# Patient Record
Sex: FEMALE | Race: WHITE | Employment: OTHER | URBAN - METROPOLITAN AREA
[De-identification: names, ages, dates, MRNs, and addresses within clinical notes are randomized per-mention and may not be internally consistent; named-entity substitution may affect disease eponyms.]

---

## 2017-02-07 PROBLEM — M25.561 RIGHT KNEE PAIN, UNSPECIFIED CHRONICITY: Status: ACTIVE | Noted: 2017-02-07

## 2017-03-03 PROCEDURE — 82570 ASSAY OF URINE CREATININE: CPT | Performed by: INTERNAL MEDICINE

## 2017-03-03 PROCEDURE — 82043 UR ALBUMIN QUANTITATIVE: CPT | Performed by: INTERNAL MEDICINE

## 2017-03-07 PROBLEM — M76.30 ITB SYNDROME: Status: ACTIVE | Noted: 2017-03-07

## 2017-03-10 PROBLEM — M76.31 ILIOTIBIAL BAND SYNDROME, RIGHT: Status: ACTIVE | Noted: 2017-03-10

## 2017-04-17 PROBLEM — T84.84XA PAINFUL TOTAL KNEE REPLACEMENT, INITIAL ENCOUNTER (HCC): Status: ACTIVE | Noted: 2017-04-17

## 2017-04-17 PROBLEM — Z96.659 PAINFUL TOTAL KNEE REPLACEMENT, INITIAL ENCOUNTER: Status: ACTIVE | Noted: 2017-04-17

## 2017-04-17 PROBLEM — T84.84XA PAINFUL TOTAL KNEE REPLACEMENT, INITIAL ENCOUNTER: Status: ACTIVE | Noted: 2017-04-17

## 2017-04-17 PROBLEM — Z96.659 PAINFUL TOTAL KNEE REPLACEMENT, INITIAL ENCOUNTER (HCC): Status: ACTIVE | Noted: 2017-04-17

## 2017-08-28 ENCOUNTER — LAB ENCOUNTER (OUTPATIENT)
Dept: LAB | Facility: HOSPITAL | Age: 62
End: 2017-08-28
Attending: INTERNAL MEDICINE
Payer: COMMERCIAL

## 2017-08-28 ENCOUNTER — HOSPITAL ENCOUNTER (OUTPATIENT)
Dept: GENERAL RADIOLOGY | Facility: HOSPITAL | Age: 62
Discharge: HOME | End: 2017-08-28
Attending: INTERNAL MEDICINE
Payer: COMMERCIAL

## 2017-08-28 DIAGNOSIS — J44.1 COPD WITH ACUTE EXACERBATION (HCC): ICD-10-CM

## 2017-08-28 LAB
BASOPHILS # BLD AUTO: 0.03 X10(3) UL (ref 0–0.1)
BASOPHILS NFR BLD AUTO: 0.3 %
EOSINOPHIL # BLD AUTO: 0.28 X10(3) UL (ref 0–0.3)
EOSINOPHIL NFR BLD AUTO: 3.2 %
ERYTHROCYTE [DISTWIDTH] IN BLOOD BY AUTOMATED COUNT: 14.4 % (ref 11.5–16)
HCT VFR BLD AUTO: 35.1 % (ref 34–50)
HGB BLD-MCNC: 11 G/DL (ref 12–16)
IMMATURE GRANULOCYTE COUNT: 0.03 X10(3) UL (ref 0–1)
IMMATURE GRANULOCYTE RATIO %: 0.3 %
LYMPHOCYTES # BLD AUTO: 2.42 X10(3) UL (ref 0.9–4)
LYMPHOCYTES NFR BLD AUTO: 27.4 %
MCH RBC QN AUTO: 26.4 PG (ref 27–33.2)
MCHC RBC AUTO-ENTMCNC: 31.3 G/DL (ref 31–37)
MCV RBC AUTO: 84.4 FL (ref 81–100)
MONOCYTES # BLD AUTO: 0.9 X10(3) UL (ref 0.1–0.6)
MONOCYTES NFR BLD AUTO: 10.2 %
NEUTROPHIL ABS PRELIM: 5.18 X10 (3) UL (ref 1.3–6.7)
NEUTROPHILS # BLD AUTO: 5.18 X10(3) UL (ref 1.3–6.7)
NEUTROPHILS NFR BLD AUTO: 58.6 %
PLATELET # BLD AUTO: 279 10(3)UL (ref 150–450)
RBC # BLD AUTO: 4.16 X10(6)UL (ref 3.8–5.1)
RED CELL DISTRIBUTION WIDTH-SD: 44.2 FL (ref 35.1–46.3)
WBC # BLD AUTO: 8.8 X10(3) UL (ref 4–13)

## 2017-08-28 PROCEDURE — 36415 COLL VENOUS BLD VENIPUNCTURE: CPT

## 2017-08-28 PROCEDURE — 71020 XR CHEST PA + LAT CHEST (CPT=71020): CPT | Performed by: INTERNAL MEDICINE

## 2017-08-28 PROCEDURE — 85025 COMPLETE CBC W/AUTO DIFF WBC: CPT

## 2017-09-05 ENCOUNTER — LAB ENCOUNTER (OUTPATIENT)
Dept: LAB | Facility: HOSPITAL | Age: 62
End: 2017-09-05
Attending: INTERNAL MEDICINE
Payer: COMMERCIAL

## 2017-09-05 DIAGNOSIS — J45.21 MILD INTERMITTENT ASTHMA WITH ACUTE EXACERBATION: ICD-10-CM

## 2017-09-05 DIAGNOSIS — D64.9 ANEMIA, UNSPECIFIED TYPE: ICD-10-CM

## 2017-09-05 LAB
DEPRECATED HBV CORE AB SER IA-ACNC: 13.9 NG/ML (ref 10–291)
HAV AB SERPL IA-ACNC: 614 PG/ML (ref 193–986)
IRON SATURATION: 13 % (ref 13–45)
IRON: 79 UG/DL (ref 28–170)
TOTAL IRON BINDING CAPACITY: 590 UG/DL (ref 298–536)
TRANSFERRIN: 396 MG/DL (ref 200–360)

## 2017-09-05 PROCEDURE — 83540 ASSAY OF IRON: CPT

## 2017-09-05 PROCEDURE — 83550 IRON BINDING TEST: CPT

## 2017-09-05 PROCEDURE — 82607 VITAMIN B-12: CPT

## 2017-09-05 PROCEDURE — 36415 COLL VENOUS BLD VENIPUNCTURE: CPT

## 2017-09-05 PROCEDURE — 82728 ASSAY OF FERRITIN: CPT

## 2017-09-06 PROCEDURE — 86141 C-REACTIVE PROTEIN HS: CPT | Performed by: ORTHOPAEDIC SURGERY

## 2017-09-06 PROCEDURE — 82784 ASSAY IGA/IGD/IGG/IGM EACH: CPT | Performed by: INTERNAL MEDICINE

## 2017-09-06 PROCEDURE — 83516 IMMUNOASSAY NONANTIBODY: CPT | Performed by: INTERNAL MEDICINE

## 2017-09-12 ENCOUNTER — LAB ENCOUNTER (OUTPATIENT)
Dept: LAB | Age: 62
End: 2017-09-12
Attending: ORTHOPAEDIC SURGERY
Payer: COMMERCIAL

## 2017-09-12 DIAGNOSIS — Z96.651 STATUS POST TOTAL RIGHT KNEE REPLACEMENT: ICD-10-CM

## 2017-09-12 DIAGNOSIS — M25.561 RIGHT KNEE PAIN, UNSPECIFIED CHRONICITY: ICD-10-CM

## 2017-09-12 PROCEDURE — 87070 CULTURE OTHR SPECIMN AEROBIC: CPT

## 2017-09-12 PROCEDURE — 87205 SMEAR GRAM STAIN: CPT

## 2017-09-12 PROCEDURE — 89051 BODY FLUID CELL COUNT: CPT

## 2017-09-12 PROCEDURE — 89060 EXAM SYNOVIAL FLUID CRYSTALS: CPT

## 2017-09-13 LAB
BASOPHIL OTHER BODY FLUID: 0 %
CRYSTALS: NEGATIVE
EOSINOPHIL OTHER BODY FLUID: 0 %
LYMPHOCYTE OTH BODY FLUID: 31 %
MONO/MAC/HISTIO OTH BF FLUID: 51 %
NEUTROPHIL OTH BODY FLUID: 17 %
RBC OTH BODY FL: NORMAL /MM3
TOTAL CELLS COUNTED: 35
WBC OTH BF: 845 /MM3

## 2017-09-26 PROBLEM — M47.896 OTHER OSTEOARTHRITIS OF SPINE, LUMBAR REGION: Status: ACTIVE | Noted: 2017-09-26

## 2017-10-11 PROCEDURE — 86003 ALLG SPEC IGE CRUDE XTRC EA: CPT | Performed by: INTERNAL MEDICINE

## 2017-12-26 PROBLEM — J43.9 PULMONARY EMPHYSEMA, UNSPECIFIED EMPHYSEMA TYPE (HCC): Status: ACTIVE | Noted: 2017-12-26

## 2017-12-26 PROBLEM — J45.50 SEVERE PERSISTENT ASTHMA WITHOUT COMPLICATION: Status: ACTIVE | Noted: 2017-12-26

## 2017-12-26 PROBLEM — J45.50 SEVERE PERSISTENT ASTHMA WITHOUT COMPLICATION (HCC): Status: ACTIVE | Noted: 2017-12-26

## 2018-03-21 PROCEDURE — 87086 URINE CULTURE/COLONY COUNT: CPT | Performed by: INTERNAL MEDICINE

## 2018-04-25 PROCEDURE — 82043 UR ALBUMIN QUANTITATIVE: CPT | Performed by: INTERNAL MEDICINE

## 2018-04-25 PROCEDURE — 82570 ASSAY OF URINE CREATININE: CPT | Performed by: INTERNAL MEDICINE

## 2018-06-04 PROBLEM — M66.831: Status: ACTIVE | Noted: 2018-06-04

## 2018-07-09 PROBLEM — M47.816 LUMBAR FACET ARTHROPATHY: Status: ACTIVE | Noted: 2018-07-09

## 2018-07-09 PROBLEM — M51.36 DDD (DEGENERATIVE DISC DISEASE), LUMBAR: Status: ACTIVE | Noted: 2018-07-09

## 2018-07-09 PROBLEM — M51.36 ANNULAR TEAR OF LUMBAR DISC: Status: ACTIVE | Noted: 2018-07-09

## 2018-08-14 PROBLEM — M76.51 PATELLAR TENDINITIS OF RIGHT KNEE: Status: ACTIVE | Noted: 2018-08-14

## 2018-08-31 PROBLEM — N39.41 URGE INCONTINENCE: Status: ACTIVE | Noted: 2018-08-31

## 2018-12-18 PROBLEM — J82.83 EOSINOPHILIC ASTHMA (HCC): Status: ACTIVE | Noted: 2018-12-18

## 2018-12-18 PROBLEM — J82.83 EOSINOPHILIC ASTHMA: Status: ACTIVE | Noted: 2018-12-18

## 2019-08-20 PROBLEM — S46.011A STRAIN OF RIGHT ROTATOR CUFF CAPSULE, INITIAL ENCOUNTER: Status: ACTIVE | Noted: 2019-08-20

## 2019-08-27 PROBLEM — M75.121 COMPLETE TEAR OF RIGHT ROTATOR CUFF, UNSPECIFIED WHETHER TRAUMATIC: Status: ACTIVE | Noted: 2019-08-27

## 2019-09-04 ENCOUNTER — ANESTHESIA EVENT (OUTPATIENT)
Dept: SURGERY | Facility: HOSPITAL | Age: 64
End: 2019-09-04
Payer: COMMERCIAL

## 2019-09-05 ENCOUNTER — HOSPITAL ENCOUNTER (OUTPATIENT)
Facility: HOSPITAL | Age: 64
Setting detail: HOSPITAL OUTPATIENT SURGERY
Discharge: HOME | End: 2019-09-05
Attending: ORTHOPAEDIC SURGERY | Admitting: ORTHOPAEDIC SURGERY
Payer: COMMERCIAL

## 2019-09-05 ENCOUNTER — ANESTHESIA (OUTPATIENT)
Dept: SURGERY | Facility: HOSPITAL | Age: 64
End: 2019-09-05
Payer: COMMERCIAL

## 2019-09-05 VITALS
BODY MASS INDEX: 37.09 KG/M2 | SYSTOLIC BLOOD PRESSURE: 116 MMHG | OXYGEN SATURATION: 93 % | DIASTOLIC BLOOD PRESSURE: 60 MMHG | TEMPERATURE: 98 F | HEART RATE: 82 BPM | RESPIRATION RATE: 14 BRPM | HEIGHT: 64 IN | WEIGHT: 217.25 LBS

## 2019-09-05 DIAGNOSIS — M75.121 COMPLETE TEAR OF RIGHT ROTATOR CUFF, UNSPECIFIED WHETHER TRAUMATIC: ICD-10-CM

## 2019-09-05 LAB
GLUCOSE BLD-MCNC: 106 MG/DL (ref 70–99)
GLUCOSE BLD-MCNC: 92 MG/DL (ref 70–99)

## 2019-09-05 PROCEDURE — 3E0T3BZ INTRODUCTION OF ANESTHETIC AGENT INTO PERIPHERAL NERVES AND PLEXI, PERCUTANEOUS APPROACH: ICD-10-PCS | Performed by: ANESTHESIOLOGY

## 2019-09-05 PROCEDURE — 0LM10ZZ REATTACHMENT OF RIGHT SHOULDER TENDON, OPEN APPROACH: ICD-10-PCS | Performed by: ORTHOPAEDIC SURGERY

## 2019-09-05 PROCEDURE — 76942 ECHO GUIDE FOR BIOPSY: CPT | Performed by: ORTHOPAEDIC SURGERY

## 2019-09-05 PROCEDURE — 82962 GLUCOSE BLOOD TEST: CPT

## 2019-09-05 DEVICE — IMPLANTABLE DEVICE: Type: IMPLANTABLE DEVICE | Site: SHOULDER | Status: FUNCTIONAL

## 2019-09-05 DEVICE — MAGNUMWIRE LOOSE NONABSORBABLE                                    SUTURE BLUE-COBRAID WITH 26.5 MM 1/2                                    CIRCLE TAPERED NEEDLE
Type: IMPLANTABLE DEVICE | Site: SHOULDER | Status: FUNCTIONAL
Brand: MAGNUMWIRE

## 2019-09-05 RX ORDER — ONDANSETRON 2 MG/ML
4 INJECTION INTRAMUSCULAR; INTRAVENOUS AS NEEDED
Status: DISCONTINUED | OUTPATIENT
Start: 2019-09-05 | End: 2019-09-05

## 2019-09-05 RX ORDER — MEPERIDINE HYDROCHLORIDE 25 MG/ML
12.5 INJECTION INTRAMUSCULAR; INTRAVENOUS; SUBCUTANEOUS AS NEEDED
Status: DISCONTINUED | OUTPATIENT
Start: 2019-09-05 | End: 2019-09-05

## 2019-09-05 RX ORDER — HYDROCODONE BITARTRATE AND ACETAMINOPHEN 5; 325 MG/1; MG/1
1 TABLET ORAL AS NEEDED
Status: COMPLETED | OUTPATIENT
Start: 2019-09-05 | End: 2019-09-05

## 2019-09-05 RX ORDER — NALOXONE HYDROCHLORIDE 0.4 MG/ML
80 INJECTION, SOLUTION INTRAMUSCULAR; INTRAVENOUS; SUBCUTANEOUS AS NEEDED
Status: DISCONTINUED | OUTPATIENT
Start: 2019-09-05 | End: 2019-09-05

## 2019-09-05 RX ORDER — ACETAMINOPHEN 500 MG
500 TABLET ORAL EVERY 6 HOURS PRN
COMMUNITY

## 2019-09-05 RX ORDER — DEXTROSE MONOHYDRATE 25 G/50ML
50 INJECTION, SOLUTION INTRAVENOUS
Status: DISCONTINUED | OUTPATIENT
Start: 2019-09-05 | End: 2019-09-05

## 2019-09-05 RX ORDER — HYDROCODONE BITARTRATE AND ACETAMINOPHEN 5; 325 MG/1; MG/1
2 TABLET ORAL AS NEEDED
Status: COMPLETED | OUTPATIENT
Start: 2019-09-05 | End: 2019-09-05

## 2019-09-05 RX ORDER — SCOLOPAMINE TRANSDERMAL SYSTEM 1 MG/1
1 PATCH, EXTENDED RELEASE TRANSDERMAL
Status: DISCONTINUED | OUTPATIENT
Start: 2019-09-05 | End: 2019-09-05

## 2019-09-05 RX ORDER — SODIUM CHLORIDE, SODIUM LACTATE, POTASSIUM CHLORIDE, CALCIUM CHLORIDE 600; 310; 30; 20 MG/100ML; MG/100ML; MG/100ML; MG/100ML
INJECTION, SOLUTION INTRAVENOUS CONTINUOUS
Status: DISCONTINUED | OUTPATIENT
Start: 2019-09-05 | End: 2019-09-05

## 2019-09-05 RX ORDER — DIPHENHYDRAMINE HYDROCHLORIDE 50 MG/ML
12.5 INJECTION INTRAMUSCULAR; INTRAVENOUS AS NEEDED
Status: DISCONTINUED | OUTPATIENT
Start: 2019-09-05 | End: 2019-09-05

## 2019-09-05 RX ORDER — CLINDAMYCIN PHOSPHATE 900 MG/50ML
900 INJECTION INTRAVENOUS ONCE
Status: COMPLETED | OUTPATIENT
Start: 2019-09-05 | End: 2019-09-05

## 2019-09-05 RX ORDER — MIDAZOLAM HYDROCHLORIDE 1 MG/ML
1 INJECTION INTRAMUSCULAR; INTRAVENOUS EVERY 5 MIN PRN
Status: DISCONTINUED | OUTPATIENT
Start: 2019-09-05 | End: 2019-09-05

## 2019-09-05 RX ORDER — HYDROMORPHONE HYDROCHLORIDE 1 MG/ML
0.4 INJECTION, SOLUTION INTRAMUSCULAR; INTRAVENOUS; SUBCUTANEOUS EVERY 5 MIN PRN
Status: DISCONTINUED | OUTPATIENT
Start: 2019-09-05 | End: 2019-09-05

## 2019-09-05 RX ORDER — METOCLOPRAMIDE HYDROCHLORIDE 5 MG/ML
10 INJECTION INTRAMUSCULAR; INTRAVENOUS AS NEEDED
Status: DISCONTINUED | OUTPATIENT
Start: 2019-09-05 | End: 2019-09-05

## 2019-09-05 RX ORDER — DEXTROSE MONOHYDRATE 25 G/50ML
50 INJECTION, SOLUTION INTRAVENOUS
Status: DISCONTINUED | OUTPATIENT
Start: 2019-09-05 | End: 2019-09-05 | Stop reason: HOSPADM

## 2019-09-05 RX ORDER — SCOLOPAMINE TRANSDERMAL SYSTEM 1 MG/1
PATCH, EXTENDED RELEASE TRANSDERMAL
Status: DISCONTINUED
Start: 2019-09-05 | End: 2019-09-05

## 2019-09-05 RX ORDER — ACETAMINOPHEN 500 MG
1000 TABLET ORAL ONCE
Status: DISCONTINUED | OUTPATIENT
Start: 2019-09-05 | End: 2019-09-05

## 2019-09-05 NOTE — ANESTHESIA POSTPROCEDURE EVALUATION
Kathryn Orozco A [place] Patient Status:  Hospital Outpatient Surgery   Age/Gender 61year old female MRN [id number]   [facility] SURGERY Attending Hyacinth Farah MD   Hosp Day # 0 PCP Pura Acosta MD       Anesthesia Post

## 2019-09-05 NOTE — ANESTHESIA PREPROCEDURE EVALUATION
PRE-OP EVALUATION    Patient Name: Montrell Hay    Pre-op Diagnosis: Complete tear of right rotator cuff, unspecified whether traumatic [M75.121]    Procedure(s):  ROTATOR CUFF REPAIR RIGHT SHOULDER    Surgeon(s) and Role:     * Agustina Spicer MD 1/2 TABLET (4 MG TOTAL) BY MOUTH DAILY AS NEEDED FOR MUSCLE SPASMS. Disp: 60 tablet Rfl: 1   Fluticasone Propionate (FLONASE ALLERGY RELIEF) 50 MCG/ACT Nasal Suspension by Nasal route daily.    Disp:  Rfl:    Ranitidine HCl 150 MG Oral Tab Take 150 mg by mo Remy Martinez MD at . [facility] 139 Right 8/21/2018    Performed by Remy Martinez MD at [facility]   • OTHER SURGICAL HISTORY  2010    Sinus    • OTHER SURGICAL HISTORY  11/17/14    [facility], infection, facial droop, phrenic block   Plan/risks discussed with: patient and spouse                Present on Admission:  **None**

## 2019-09-05 NOTE — H&P
Pearl LOPEZ [place]   9/3/2019 9:00 AM   Office Visit   MRN:  [id number]   Description: 61year old female Provider: Yesenia Parisi MD Department: Sp Pulmonary Medicine   Scanning Cover Sheet     Click to print Dayanara Circe 850 for scan   Performed by Patric Benton MD at [facility] MAIN OR   • KNEE REPLACEMENT SURGERY Right     • KNEE TOTAL REPLACEMENT Right 9/8/2016     Performed by Patric Benton MD at [facility] MAIN OR   • LEFT PHACOEMULSIFICATION OF CATARACT WITH [place] IMPLANT 7133 GABAPENTIN 300 MG Oral Cap TAKE 2 CAPSULES BY MOUTH EVERY MORNING, THEN 3 CAPSULES EVERY NIGHT Disp: 450 capsule Rfl: 1   SPIRIVA RESPIMAT 2.5 MCG/ACT Inhalation Aero Soln INHALE 2 PUFFS INTO THE LUNGS DAILY.  Disp: 3 Inhaler Rfl: 1   triamcinolone acetonid Alcohol/week: 5.8 standard drinks      Types: 7 Glasses of wine per week      Comment: 1-2 drinks daily    Drug use:  No           Family History   Problem Relation Age of Onset   • Breast Cancer Paternal Grandmother 72         dx 63's   • Cancer Patern -needs scan in January 2020- ordered today  4. Obesity  -discussed need to lose weight as it will help her asthma control  -discussed screening for RACHEL at next visit  5.  Preop Resp Exam  -pt is at mild increased risk for planned shoulder surgery with Dr. Bonifacio Chen

## 2019-09-05 NOTE — INTERVAL H&P NOTE
Pre-op Diagnosis: Complete tear of right rotator cuff, unspecified whether traumatic [M75.121]    The above referenced H&P was reviewed by Jake Miguel MD on 9/5/2019, the patient was examined and no significant changes have occurred in the patient's c

## 2019-09-05 NOTE — BRIEF OP NOTE
Pre-Operative Diagnosis: Complete tear of right rotator cuff, unspecified whether traumatic [M75.121]     Post-Operative Diagnosis: Complete tear of right rotator cuff, unspecified whether traumatic [M75.121]      Procedure Performed:   Procedure(s):  ROTA

## 2019-09-06 NOTE — OPERATIVE REPORT
[facility]    PATIENT'S NAME: Sarah Ibrahim   ATTENDING PHYSICIAN: Adan Espinal M.D. OPERATING PHYSICIAN: Adan Espinal M.D.    PATIENT ACCOUNT#:   [de-identified]    LOCATION:  [address] [place] [address] EDW 10  MEDICAL RECORD #:   [id number] then examined and found to be retracted and, with release, it was better mobilized but still not perfect condition at the tendon itself. The leading edge of the tendon was debrided using the knife.   The bur was used to partially decorticate the insertion procedure well. There were no complications. Blood loss was minimal.  Patient was taken to Recovery in stable condition, to be discharged home when fully recovered.     Dictated By Roula Lynn M.D.  d: 09/06/2019 07:36:02  t: 09/06/2019 10:27:45  Job 2

## 2019-09-09 PROBLEM — Z47.89 ORTHOPEDIC AFTERCARE: Status: ACTIVE | Noted: 2019-09-09

## 2019-10-01 PROBLEM — Z98.890 STATUS POST RIGHT ROTATOR CUFF REPAIR: Status: ACTIVE | Noted: 2019-10-01

## 2020-03-06 ENCOUNTER — APPOINTMENT (OUTPATIENT)
Dept: GENERAL RADIOLOGY | Facility: HOSPITAL | Age: 65
End: 2020-03-06
Attending: EMERGENCY MEDICINE
Payer: COMMERCIAL

## 2020-03-06 ENCOUNTER — HOSPITAL ENCOUNTER (EMERGENCY)
Facility: HOSPITAL | Age: 65
Discharge: HOME | End: 2020-03-06
Attending: EMERGENCY MEDICINE
Payer: COMMERCIAL

## 2020-03-06 VITALS
HEART RATE: 79 BPM | BODY MASS INDEX: 37 KG/M2 | DIASTOLIC BLOOD PRESSURE: 83 MMHG | TEMPERATURE: 98 F | WEIGHT: 218 LBS | SYSTOLIC BLOOD PRESSURE: 147 MMHG | OXYGEN SATURATION: 95 % | RESPIRATION RATE: 15 BRPM

## 2020-03-06 DIAGNOSIS — J18.9 COMMUNITY ACQUIRED PNEUMONIA OF RIGHT LOWER LOBE OF LUNG: Primary | ICD-10-CM

## 2020-03-06 PROBLEM — J44.89 EMPHYSEMA WITH CHRONIC BRONCHITIS: Status: ACTIVE | Noted: 2017-10-27

## 2020-03-06 PROBLEM — M75.121 COMPLETE TEAR OF RIGHT ROTATOR CUFF, UNSPECIFIED WHETHER TRAUMATIC: Status: RESOLVED | Noted: 2019-08-27 | Resolved: 2020-03-06

## 2020-03-06 PROBLEM — J44.89 EMPHYSEMA WITH CHRONIC BRONCHITIS (HCC): Status: ACTIVE | Noted: 2017-10-27

## 2020-03-06 PROBLEM — M76.51 PATELLAR TENDINITIS OF RIGHT KNEE: Status: RESOLVED | Noted: 2018-08-14 | Resolved: 2020-03-06

## 2020-03-06 PROBLEM — J43.9 PULMONARY EMPHYSEMA, UNSPECIFIED EMPHYSEMA TYPE (HCC): Status: RESOLVED | Noted: 2017-12-26 | Resolved: 2020-03-06

## 2020-03-06 PROBLEM — M51.36 DDD (DEGENERATIVE DISC DISEASE), LUMBAR: Status: RESOLVED | Noted: 2018-07-09 | Resolved: 2020-03-06

## 2020-03-06 PROBLEM — J44.9 EMPHYSEMA WITH CHRONIC BRONCHITIS (HCC): Status: ACTIVE | Noted: 2017-10-27

## 2020-03-06 PROBLEM — M25.561 RIGHT KNEE PAIN, UNSPECIFIED CHRONICITY: Status: RESOLVED | Noted: 2017-02-07 | Resolved: 2020-03-06

## 2020-03-06 PROBLEM — M47.896 OTHER OSTEOARTHRITIS OF SPINE, LUMBAR REGION: Status: RESOLVED | Noted: 2017-09-26 | Resolved: 2020-03-06

## 2020-03-06 PROBLEM — J45.50 SEVERE PERSISTENT ASTHMA WITHOUT COMPLICATION (HCC): Status: RESOLVED | Noted: 2017-12-26 | Resolved: 2020-03-06

## 2020-03-06 PROBLEM — Z98.890 STATUS POST RIGHT ROTATOR CUFF REPAIR: Status: RESOLVED | Noted: 2019-10-01 | Resolved: 2020-03-06

## 2020-03-06 PROBLEM — S46.011A STRAIN OF RIGHT ROTATOR CUFF CAPSULE, INITIAL ENCOUNTER: Status: RESOLVED | Noted: 2019-08-20 | Resolved: 2020-03-06

## 2020-03-06 PROBLEM — M76.30 ITB SYNDROME: Status: RESOLVED | Noted: 2017-03-07 | Resolved: 2020-03-06

## 2020-03-06 PROBLEM — J45.50 SEVERE PERSISTENT ASTHMA WITHOUT COMPLICATION: Status: RESOLVED | Noted: 2017-12-26 | Resolved: 2020-03-06

## 2020-03-06 PROBLEM — M76.31 ILIOTIBIAL BAND SYNDROME, RIGHT: Status: RESOLVED | Noted: 2017-03-10 | Resolved: 2020-03-06

## 2020-03-06 PROBLEM — M66.831: Status: RESOLVED | Noted: 2018-06-04 | Resolved: 2020-03-06

## 2020-03-06 LAB
ANION GAP SERPL CALC-SCNC: 5 MMOL/L (ref 0–18)
ATRIAL RATE: 81 BPM
BASOPHILS # BLD AUTO: 0.04 X10(3) UL (ref 0–0.2)
BASOPHILS NFR BLD AUTO: 0.5 %
BUN BLD-MCNC: 11 MG/DL (ref 7–18)
BUN/CREAT SERPL: 11.5 (ref 10–20)
CALCIUM BLD-MCNC: 9.2 MG/DL (ref 8.5–10.1)
CHLORIDE SERPL-SCNC: 99 MMOL/L (ref 98–112)
CO2 SERPL-SCNC: 33 MMOL/L (ref 21–32)
CREAT BLD-MCNC: 0.96 MG/DL (ref 0.55–1.02)
DEPRECATED RDW RBC AUTO: 49.2 FL (ref 35.1–46.3)
EOSINOPHIL # BLD AUTO: 0.26 X10(3) UL (ref 0–0.7)
EOSINOPHIL NFR BLD AUTO: 3.3 %
ERYTHROCYTE [DISTWIDTH] IN BLOOD BY AUTOMATED COUNT: 15.9 % (ref 11–15)
GLUCOSE BLD-MCNC: 90 MG/DL (ref 70–99)
HCT VFR BLD AUTO: 36.8 % (ref 35–48)
HGB BLD-MCNC: 11.7 G/DL (ref 12–16)
IMM GRANULOCYTES # BLD AUTO: 0.04 X10(3) UL (ref 0–1)
IMM GRANULOCYTES NFR BLD: 0.5 %
LYMPHOCYTES # BLD AUTO: 2.76 X10(3) UL (ref 1–4)
LYMPHOCYTES NFR BLD AUTO: 34.5 %
MCH RBC QN AUTO: 27 PG (ref 26–34)
MCHC RBC AUTO-ENTMCNC: 31.8 G/DL (ref 31–37)
MCV RBC AUTO: 85 FL (ref 80–100)
MONOCYTES # BLD AUTO: 0.67 X10(3) UL (ref 0.1–1)
MONOCYTES NFR BLD AUTO: 8.4 %
NEUTROPHILS # BLD AUTO: 4.23 X10 (3) UL (ref 1.5–7.7)
NEUTROPHILS # BLD AUTO: 4.23 X10(3) UL (ref 1.5–7.7)
NEUTROPHILS NFR BLD AUTO: 52.8 %
OSMOLALITY SERPL CALC.SUM OF ELEC: 283 MOSM/KG (ref 275–295)
P AXIS: 27 DEGREES
P-R INTERVAL: 96 MS
PLATELET # BLD AUTO: 318 10(3)UL (ref 150–450)
POTASSIUM SERPL-SCNC: 3.3 MMOL/L (ref 3.5–5.1)
Q-T INTERVAL: 418 MS
QRS DURATION: 84 MS
QTC CALCULATION (BEZET): 485 MS
R AXIS: 2 DEGREES
RBC # BLD AUTO: 4.33 X10(6)UL (ref 3.8–5.3)
SODIUM SERPL-SCNC: 137 MMOL/L (ref 136–145)
T AXIS: 44 DEGREES
TROPONIN I SERPL-MCNC: <0.045 NG/ML (ref ?–0.04)
VENTRICULAR RATE: 81 BPM
WBC # BLD AUTO: 8 X10(3) UL (ref 4–11)

## 2020-03-06 PROCEDURE — 36415 COLL VENOUS BLD VENIPUNCTURE: CPT

## 2020-03-06 PROCEDURE — 99284 EMERGENCY DEPT VISIT MOD MDM: CPT

## 2020-03-06 PROCEDURE — 99285 EMERGENCY DEPT VISIT HI MDM: CPT

## 2020-03-06 PROCEDURE — 84484 ASSAY OF TROPONIN QUANT: CPT | Performed by: EMERGENCY MEDICINE

## 2020-03-06 PROCEDURE — 85025 COMPLETE CBC W/AUTO DIFF WBC: CPT | Performed by: EMERGENCY MEDICINE

## 2020-03-06 PROCEDURE — 93010 ELECTROCARDIOGRAM REPORT: CPT

## 2020-03-06 PROCEDURE — 93005 ELECTROCARDIOGRAM TRACING: CPT

## 2020-03-06 PROCEDURE — 71045 X-RAY EXAM CHEST 1 VIEW: CPT | Performed by: EMERGENCY MEDICINE

## 2020-03-06 PROCEDURE — 80048 BASIC METABOLIC PNL TOTAL CA: CPT | Performed by: EMERGENCY MEDICINE

## 2020-03-06 RX ORDER — AZITHROMYCIN 250 MG/1
TABLET, FILM COATED ORAL
Qty: 1 PACKAGE | Refills: 0 | Status: SHIPPED | OUTPATIENT
Start: 2020-03-06 | End: 2020-03-11

## 2020-03-06 RX ORDER — PREDNISONE 20 MG/1
40 TABLET ORAL DAILY
Qty: 10 TABLET | Refills: 0 | Status: SHIPPED | OUTPATIENT
Start: 2020-03-06 | End: 2020-03-11

## 2020-03-06 NOTE — ED INITIAL ASSESSMENT (HPI)
Pt states that Monday she came home and was very tired. On Tuesday pt started having chest pain and throat pain with body aches. Pt had EKG done at office. Pt feels the need to make a phone call instead of answer questions for me.  Pt also reports her son r

## 2020-03-06 NOTE — ED PROVIDER NOTES
Patient Seen in: [facility] Emergency Department      History   Patient presents with:  Fever  Dyspnea STELLA SOB  Sore Throat    Stated Complaint: sent for further work up for fever, sore throat, bodyaches. denies cough.  chest*    HPI    This a very p SURGERY Right    • KNEE TOTAL REPLACEMENT Right 9/8/2016    Performed by Chris Knight MD at [facility] MAIN OR   • LEFT PHACOEMULSIFICATION OF CATARACT WITH INTRAOCULAR LENS IMPLANT [id number] Left 1/30/2017    Performed by Angelina Ludwig MD at [place] [address] denies cough. chest*  Other systems are as noted in HPI. Constitutional and vital signs reviewed. All other systems reviewed and negative except as noted above.     Physical Exam     ED Triage Vitals [03/06/20 1458]   /67   Pulse 102   Resp 18 DIFFERENTIAL WITH PLATELET    Narrative: The following orders were created for panel order CBC WITH DIFFERENTIAL WITH PLATELET.   Procedure                               Abnormality         Status                     --------- antibiotics for pneumonia as well as prednisone. Answered all of her and her 's questions told return immediate for any worsening symptoms such as shortness of breath, alteration mental status, any worsening pain.   Answered all of her questions the

## 2020-03-14 ENCOUNTER — APPOINTMENT (OUTPATIENT)
Dept: GENERAL RADIOLOGY | Facility: HOSPITAL | Age: 65
End: 2020-03-14
Attending: EMERGENCY MEDICINE
Payer: COMMERCIAL

## 2020-03-14 ENCOUNTER — HOSPITAL ENCOUNTER (EMERGENCY)
Facility: HOSPITAL | Age: 65
Discharge: HOME | End: 2020-03-14
Attending: EMERGENCY MEDICINE
Payer: COMMERCIAL

## 2020-03-14 VITALS
HEART RATE: 78 BPM | DIASTOLIC BLOOD PRESSURE: 72 MMHG | WEIGHT: 221 LBS | BODY MASS INDEX: 37.73 KG/M2 | HEIGHT: 64 IN | RESPIRATION RATE: 18 BRPM | SYSTOLIC BLOOD PRESSURE: 136 MMHG | OXYGEN SATURATION: 98 % | TEMPERATURE: 99 F

## 2020-03-14 DIAGNOSIS — J44.1 COPD EXACERBATION (HCC): Primary | ICD-10-CM

## 2020-03-14 LAB
ALBUMIN SERPL-MCNC: 3.6 G/DL (ref 3.4–5)
ALBUMIN/GLOB SERPL: 0.9 {RATIO} (ref 1–2)
ALP LIVER SERPL-CCNC: 83 U/L (ref 50–130)
ALT SERPL-CCNC: 36 U/L (ref 13–56)
ANION GAP SERPL CALC-SCNC: 9 MMOL/L (ref 0–18)
AST SERPL-CCNC: 45 U/L (ref 15–37)
BASOPHILS # BLD AUTO: 0.03 X10(3) UL (ref 0–0.2)
BASOPHILS NFR BLD AUTO: 0.3 %
BILIRUB SERPL-MCNC: 0.2 MG/DL (ref 0.1–2)
BUN BLD-MCNC: 15 MG/DL (ref 7–18)
BUN/CREAT SERPL: 11.3 (ref 10–20)
CALCIUM BLD-MCNC: 9.1 MG/DL (ref 8.5–10.1)
CHLORIDE SERPL-SCNC: 99 MMOL/L (ref 98–112)
CO2 SERPL-SCNC: 29 MMOL/L (ref 21–32)
CREAT BLD-MCNC: 1.33 MG/DL (ref 0.55–1.02)
D-DIMER: 0.47 UG/ML FEU (ref ?–0.64)
DEPRECATED RDW RBC AUTO: 48.1 FL (ref 35.1–46.3)
EOSINOPHIL # BLD AUTO: 0.01 X10(3) UL (ref 0–0.7)
EOSINOPHIL NFR BLD AUTO: 0.1 %
ERYTHROCYTE [DISTWIDTH] IN BLOOD BY AUTOMATED COUNT: 15.9 % (ref 11–15)
GLOBULIN PLAS-MCNC: 4.2 G/DL (ref 2.8–4.4)
GLUCOSE BLD-MCNC: 132 MG/DL (ref 70–99)
HCT VFR BLD AUTO: 38.4 % (ref 35–48)
HGB BLD-MCNC: 12.5 G/DL (ref 12–16)
IMM GRANULOCYTES # BLD AUTO: 0.1 X10(3) UL (ref 0–1)
IMM GRANULOCYTES NFR BLD: 0.9 %
LYMPHOCYTES # BLD AUTO: 1.73 X10(3) UL (ref 1–4)
LYMPHOCYTES NFR BLD AUTO: 15.1 %
M PROTEIN MFR SERPL ELPH: 7.8 G/DL (ref 6.4–8.2)
MCH RBC QN AUTO: 27.2 PG (ref 26–34)
MCHC RBC AUTO-ENTMCNC: 32.6 G/DL (ref 31–37)
MCV RBC AUTO: 83.5 FL (ref 80–100)
MONOCYTES # BLD AUTO: 0.36 X10(3) UL (ref 0.1–1)
MONOCYTES NFR BLD AUTO: 3.1 %
NEUTROPHILS # BLD AUTO: 9.2 X10 (3) UL (ref 1.5–7.7)
NEUTROPHILS # BLD AUTO: 9.2 X10(3) UL (ref 1.5–7.7)
NEUTROPHILS NFR BLD AUTO: 80.5 %
OSMOLALITY SERPL CALC.SUM OF ELEC: 287 MOSM/KG (ref 275–295)
PLATELET # BLD AUTO: 433 10(3)UL (ref 150–450)
POTASSIUM SERPL-SCNC: 3.8 MMOL/L (ref 3.5–5.1)
RBC # BLD AUTO: 4.6 X10(6)UL (ref 3.8–5.3)
SODIUM SERPL-SCNC: 137 MMOL/L (ref 136–145)
TROPONIN I SERPL-MCNC: <0.045 NG/ML (ref ?–0.04)
WBC # BLD AUTO: 11.4 X10(3) UL (ref 4–11)

## 2020-03-14 PROCEDURE — 85379 FIBRIN DEGRADATION QUANT: CPT | Performed by: EMERGENCY MEDICINE

## 2020-03-14 PROCEDURE — 93010 ELECTROCARDIOGRAM REPORT: CPT

## 2020-03-14 PROCEDURE — 94640 AIRWAY INHALATION TREATMENT: CPT

## 2020-03-14 PROCEDURE — 99285 EMERGENCY DEPT VISIT HI MDM: CPT

## 2020-03-14 PROCEDURE — 84484 ASSAY OF TROPONIN QUANT: CPT | Performed by: EMERGENCY MEDICINE

## 2020-03-14 PROCEDURE — 80053 COMPREHEN METABOLIC PANEL: CPT | Performed by: EMERGENCY MEDICINE

## 2020-03-14 PROCEDURE — 85025 COMPLETE CBC W/AUTO DIFF WBC: CPT | Performed by: EMERGENCY MEDICINE

## 2020-03-14 PROCEDURE — 93005 ELECTROCARDIOGRAM TRACING: CPT

## 2020-03-14 PROCEDURE — 36415 COLL VENOUS BLD VENIPUNCTURE: CPT

## 2020-03-14 PROCEDURE — 71045 X-RAY EXAM CHEST 1 VIEW: CPT | Performed by: EMERGENCY MEDICINE

## 2020-03-14 RX ORDER — IPRATROPIUM BROMIDE AND ALBUTEROL SULFATE 2.5; .5 MG/3ML; MG/3ML
3 SOLUTION RESPIRATORY (INHALATION) ONCE
Status: COMPLETED | OUTPATIENT
Start: 2020-03-14 | End: 2020-03-14

## 2020-03-15 NOTE — ED INITIAL ASSESSMENT (HPI)
Patient here with c/o SOB and fever. Patient has hx of asthma, COPD and emphysema. Patient was diagnosed with Pneumonia last week, took all her antibiotics. On 3/10 she saw her primary who instructed her to start prednisone that day.   Patient says KAY mackenzie

## 2020-03-15 NOTE — ED PROVIDER NOTES
Patient Seen in: [facility] Emergency Department      History   Patient presents with:  Cough/URI  Dyspnea STELLA SOB    Stated Complaint: dx with pneumonia friday on oral abx, hx of copd and asthma, states no cough bu*    HPI    Patient is a 59 old fe Chris Knight MD at [facility] MAIN OR   • LEFT PHACOEMULSIFICATION OF CATARACT WITH INTRAOCULAR LENS IMPLANT [id number] Left 1/30/2017    Performed by Angelina Ludwig MD at [place] [address] / TRANSFORAMINAL EPIDURAL STEROID INJECTION Right 6/6/2 and vital signs reviewed. All other systems reviewed and negative except as noted above.     Physical Exam     ED Triage Vitals   BP 03/14/20 1942 153/78   Pulse 03/14/20 1942 105   Resp 03/14/20 1942 22   Temp 03/14/20 1942 98.3 °F (36.8 °C)   Temp sr Narrative: The following orders were created for panel order CBC WITH DIFFERENTIAL WITH PLATELET.   Procedure                               Abnormality         Status                     ---------                               -----------         ------

## 2020-03-16 LAB
ATRIAL RATE: 94 BPM
P AXIS: 55 DEGREES
P-R INTERVAL: 104 MS
Q-T INTERVAL: 372 MS
QRS DURATION: 80 MS
QTC CALCULATION (BEZET): 465 MS
R AXIS: 24 DEGREES
T AXIS: 91 DEGREES
VENTRICULAR RATE: 94 BPM

## 2020-04-06 ENCOUNTER — APPOINTMENT (OUTPATIENT)
Dept: GENERAL RADIOLOGY | Facility: HOSPITAL | Age: 65
DRG: 177 | End: 2020-04-06
Attending: EMERGENCY MEDICINE
Payer: COMMERCIAL

## 2020-04-06 ENCOUNTER — HOSPITAL ENCOUNTER (INPATIENT)
Facility: HOSPITAL | Age: 65
LOS: 12 days | Discharge: HOME | DRG: 177 | End: 2020-04-18
Attending: EMERGENCY MEDICINE | Admitting: INTERNAL MEDICINE
Payer: COMMERCIAL

## 2020-04-06 DIAGNOSIS — U07.1 COVID-19: Primary | ICD-10-CM

## 2020-04-06 PROCEDURE — 82550 ASSAY OF CK (CPK): CPT | Performed by: EMERGENCY MEDICINE

## 2020-04-06 PROCEDURE — 96361 HYDRATE IV INFUSION ADD-ON: CPT

## 2020-04-06 PROCEDURE — 87040 BLOOD CULTURE FOR BACTERIA: CPT | Performed by: EMERGENCY MEDICINE

## 2020-04-06 PROCEDURE — 93010 ELECTROCARDIOGRAM REPORT: CPT | Performed by: INTERNAL MEDICINE

## 2020-04-06 PROCEDURE — 99285 EMERGENCY DEPT VISIT HI MDM: CPT

## 2020-04-06 PROCEDURE — 86140 C-REACTIVE PROTEIN: CPT | Performed by: EMERGENCY MEDICINE

## 2020-04-06 PROCEDURE — 36415 COLL VENOUS BLD VENIPUNCTURE: CPT

## 2020-04-06 PROCEDURE — 80053 COMPREHEN METABOLIC PANEL: CPT | Performed by: EMERGENCY MEDICINE

## 2020-04-06 PROCEDURE — 96366 THER/PROPH/DIAG IV INF ADDON: CPT

## 2020-04-06 PROCEDURE — 83605 ASSAY OF LACTIC ACID: CPT | Performed by: EMERGENCY MEDICINE

## 2020-04-06 PROCEDURE — 94640 AIRWAY INHALATION TREATMENT: CPT

## 2020-04-06 PROCEDURE — 82962 GLUCOSE BLOOD TEST: CPT

## 2020-04-06 PROCEDURE — 83615 LACTATE (LD) (LDH) ENZYME: CPT | Performed by: EMERGENCY MEDICINE

## 2020-04-06 PROCEDURE — 82728 ASSAY OF FERRITIN: CPT | Performed by: EMERGENCY MEDICINE

## 2020-04-06 PROCEDURE — 71045 X-RAY EXAM CHEST 1 VIEW: CPT | Performed by: EMERGENCY MEDICINE

## 2020-04-06 PROCEDURE — 85379 FIBRIN DEGRADATION QUANT: CPT | Performed by: EMERGENCY MEDICINE

## 2020-04-06 PROCEDURE — 96365 THER/PROPH/DIAG IV INF INIT: CPT

## 2020-04-06 PROCEDURE — 85652 RBC SED RATE AUTOMATED: CPT | Performed by: EMERGENCY MEDICINE

## 2020-04-06 PROCEDURE — 83036 HEMOGLOBIN GLYCOSYLATED A1C: CPT | Performed by: INTERNAL MEDICINE

## 2020-04-06 PROCEDURE — 85025 COMPLETE CBC W/AUTO DIFF WBC: CPT | Performed by: EMERGENCY MEDICINE

## 2020-04-06 PROCEDURE — 84145 PROCALCITONIN (PCT): CPT | Performed by: EMERGENCY MEDICINE

## 2020-04-06 PROCEDURE — 93005 ELECTROCARDIOGRAM TRACING: CPT

## 2020-04-06 RX ORDER — MONTELUKAST SODIUM 10 MG/1
10 TABLET ORAL NIGHTLY
COMMUNITY
End: 2020-07-21

## 2020-04-06 RX ORDER — MONTELUKAST SODIUM 10 MG/1
10 TABLET ORAL NIGHTLY
Status: DISCONTINUED | OUTPATIENT
Start: 2020-04-06 | End: 2020-04-18

## 2020-04-06 RX ORDER — OXYBUTYNIN CHLORIDE 5 MG/1
5 TABLET ORAL 2 TIMES DAILY
Status: DISCONTINUED | OUTPATIENT
Start: 2020-04-06 | End: 2020-04-18

## 2020-04-06 RX ORDER — AMITRIPTYLINE HYDROCHLORIDE 10 MG/1
10 TABLET, FILM COATED ORAL NIGHTLY
Status: CANCELLED | OUTPATIENT
Start: 2020-04-06

## 2020-04-06 RX ORDER — GABAPENTIN 300 MG/1
600 CAPSULE ORAL EVERY MORNING
COMMUNITY
End: 2020-06-27

## 2020-04-06 RX ORDER — ACETAMINOPHEN 325 MG/1
650 TABLET ORAL EVERY 6 HOURS PRN
Status: DISCONTINUED | OUTPATIENT
Start: 2020-04-06 | End: 2020-04-18

## 2020-04-06 RX ORDER — ALPRAZOLAM 0.25 MG/1
0.25 TABLET ORAL DAILY PRN
Status: DISCONTINUED | OUTPATIENT
Start: 2020-04-06 | End: 2020-04-07

## 2020-04-06 RX ORDER — DOCUSATE SODIUM 100 MG/1
100 CAPSULE, LIQUID FILLED ORAL DAILY PRN
Status: DISCONTINUED | OUTPATIENT
Start: 2020-04-06 | End: 2020-04-18

## 2020-04-06 RX ORDER — ENOXAPARIN SODIUM 100 MG/ML
40 INJECTION SUBCUTANEOUS EVERY EVENING
Status: DISCONTINUED | OUTPATIENT
Start: 2020-04-06 | End: 2020-04-16

## 2020-04-06 RX ORDER — HYDROXYCHLOROQUINE SULFATE 200 MG/1
400 TABLET, FILM COATED ORAL 2 TIMES DAILY
Status: COMPLETED | OUTPATIENT
Start: 2020-04-06 | End: 2020-04-06

## 2020-04-06 RX ORDER — PAROXETINE HYDROCHLORIDE 20 MG/1
15 TABLET, FILM COATED ORAL EVERY MORNING
Status: CANCELLED | OUTPATIENT
Start: 2020-04-07

## 2020-04-06 RX ORDER — GABAPENTIN 300 MG/1
900 CAPSULE ORAL NIGHTLY
COMMUNITY
End: 2020-10-01

## 2020-04-06 RX ORDER — BENZONATATE 200 MG/1
200 CAPSULE ORAL 3 TIMES DAILY
Status: DISCONTINUED | OUTPATIENT
Start: 2020-04-06 | End: 2020-04-18

## 2020-04-06 RX ORDER — ALPRAZOLAM 0.25 MG/1
0.25 TABLET ORAL NIGHTLY PRN
Status: ON HOLD | COMMUNITY
End: 2020-04-29

## 2020-04-06 RX ORDER — MELATONIN
400 DAILY
Status: DISCONTINUED | OUTPATIENT
Start: 2020-04-07 | End: 2020-04-18

## 2020-04-06 RX ORDER — LISINOPRIL 5 MG/1
5 TABLET ORAL NIGHTLY
Status: ON HOLD | COMMUNITY
End: 2020-05-04

## 2020-04-06 RX ORDER — CODEINE PHOSPHATE AND GUAIFENESIN 10; 100 MG/5ML; MG/5ML
10 SOLUTION ORAL EVERY 6 HOURS PRN
Status: DISCONTINUED | OUTPATIENT
Start: 2020-04-06 | End: 2020-04-18

## 2020-04-06 RX ORDER — ALBUTEROL SULFATE 90 UG/1
2 AEROSOL, METERED RESPIRATORY (INHALATION) EVERY 6 HOURS PRN
Status: DISCONTINUED | OUTPATIENT
Start: 2020-04-06 | End: 2020-04-18

## 2020-04-06 RX ORDER — LISINOPRIL 5 MG/1
5 TABLET ORAL NIGHTLY
Status: CANCELLED | OUTPATIENT
Start: 2020-04-06

## 2020-04-06 RX ORDER — DEXTROSE MONOHYDRATE 25 G/50ML
50 INJECTION, SOLUTION INTRAVENOUS
Status: DISCONTINUED | OUTPATIENT
Start: 2020-04-06 | End: 2020-04-18

## 2020-04-06 RX ORDER — HYDROXYCHLOROQUINE SULFATE 200 MG/1
200 TABLET, FILM COATED ORAL 2 TIMES DAILY
Status: COMPLETED | OUTPATIENT
Start: 2020-04-07 | End: 2020-04-10

## 2020-04-06 RX ORDER — HYDROCHLOROTHIAZIDE 25 MG/1
25 TABLET ORAL DAILY
Status: ON HOLD | COMMUNITY
End: 2020-04-15

## 2020-04-06 RX ORDER — DICYCLOMINE HYDROCHLORIDE 10 MG/1
10 CAPSULE ORAL 2 TIMES DAILY PRN
Status: DISCONTINUED | OUTPATIENT
Start: 2020-04-06 | End: 2020-04-18

## 2020-04-06 RX ORDER — GABAPENTIN 300 MG/1
900 CAPSULE ORAL NIGHTLY
Status: DISCONTINUED | OUTPATIENT
Start: 2020-04-06 | End: 2020-04-18

## 2020-04-06 RX ORDER — GABAPENTIN 300 MG/1
600 CAPSULE ORAL EVERY MORNING
Status: DISCONTINUED | OUTPATIENT
Start: 2020-04-07 | End: 2020-04-18

## 2020-04-06 RX ORDER — DICYCLOMINE HYDROCHLORIDE 10 MG/1
10 CAPSULE ORAL 2 TIMES DAILY PRN
COMMUNITY
End: 2020-12-22

## 2020-04-06 RX ORDER — AZITHROMYCIN 250 MG/1
500 TABLET, FILM COATED ORAL DAILY
Status: COMPLETED | OUTPATIENT
Start: 2020-04-07 | End: 2020-04-08

## 2020-04-06 RX ORDER — AMITRIPTYLINE HYDROCHLORIDE 10 MG/1
10 TABLET, FILM COATED ORAL NIGHTLY
COMMUNITY
End: 2020-09-11

## 2020-04-06 NOTE — ED PROVIDER NOTES
Patient Seen in: [facility] Emergency Department      History   Patient presents with:  Cough/URI  Dyspnea STELLA SOB    Stated Complaint:     HPI    Patient is a 80-year-old female comes emergency room for evaluation of shortness of breath.   Patient h INTRAOCULAR LENS IMPLANT [id number] Left 1/30/2017    Performed by Omaira Moran MD at [place] [address] / TRANSFORAMINAL EPIDURAL STEROID INJECTION Right 6/6/2018    Performed by Tabitha Libman, MD at [address] [place] [address]   • LUMBAR F Pulse 105   Resp 21   Temp 99.4 °F (37.4 °C)   Temp src Temporal   SpO2 (!) 85 %   O2 Device None (Room air)       Current:BP 96/58   Pulse 91   Temp 99.4 °F (37.4 °C) (Temporal)   Resp 22   Ht 162.6 cm (5' 4\")   Wt 97.5 kg   LMP  (LMP Unknown)   SpO2 9 American 58 (*)     Albumin 3.3 (*)     Globulin  4.7 (*)     A/G Ratio 0.7 (*)     All other components within normal limits   CBC W/ DIFFERENTIAL - Abnormal; Notable for the following components:    RDW 16.1 (*)     RDW-SD 49.2 (*)     Lymphocyte Absolut granulomas. Ct Chest (cpt=71250)    Result Date: 4/1/2020  DATE OF SERVICE: 04.01.2020 CT CHEST (CPT=71250) CLINICAL INDICATION: Other general symptoms and signs. COMPARISON STUDY: CT 12/27/2019.  TECHNIQUE: Helical images of the chest were obtained from PATIENT STATED HISTORY: (As transcribed by Technologist)  Patient states her shortness of breath started worsening last night. FINDINGS:  Increased left lower lobe consolidation. Cardiac silhouette is unchanged. Pulmonary vasculature is stable.   No pn with known COVID-19 and shortness of breath requiring supplemental oxygen. Admission disposition: 4/6/2020  7:35 AM         Patient started on Zithromax, Plaquenil.   Discussed with pulmonary, infectious disease, hospitalist.  She will be admitted to [facility]

## 2020-04-06 NOTE — PROGRESS NOTES
Pharmacy Note:  Route Optimization for Azithromycin (Zithromax)         Patient is currently on azithromycin  500mg IV q24hr x2 doses (1st dose in ED).   The patient meets the criteria to convert to the oral equivalent as established by the IV to oral con

## 2020-04-06 NOTE — CONSULTS
INFECTIOUS DISEASE CONSULT NOTE    Win [place] Patient Status:  Inpatient    1955 MRN [id number]   [facility] 3SW-A Attending Batsheva Prater MD   Hosp Day # 0 PCP Ladarius Cox at [facility] MAIN OR   • KNEE REPLACEMENT SURGERY Right    • KNEE TOTAL REPLACEMENT Right 9/8/2016    Performed by Jamia Matias MD at [address] [facility] [address]   • LEFT PHACOEMULSIFICATION OF CATARACT WITH INTRAOCULAR LENS IMPLANT [id number] Left 1/30/2017    Performed by Alyse Birmingham standard drinks of alcohol per week. She reports that she does not use drugs.     Allergies:    Dander                  UNKNOWN    Comment:CAT  Ertapenem               RASH, ITCHING  Grass                   UNKNOWN  Statins                     Comment:[place] Sulfate  (90 Base) MCG/ACT inhaler 2 puff, 2 puff, Inhalation, Q6H PRN  •  benzonatate (TESSALON) cap 200 mg, 200 mg, Oral, TID  •  guaiFENesin-codeine (ROBITUSSIN AC) 100-10 MG/5ML syrup 10 mL, 10 mL, Oral, Q6H PRN  •  [START ON 4/7/2020] azithromy HGB 12.8 12.2   HCT 38.9 38.4   MCV 83.3 83.8   MCH 27.4 26.6   MCHC 32.9 31.8   RDW 16.6* 16.1*   NEPRELIM  --  4.61   WBC 5.07 5.9    239.0     Recent Labs   Lab 04/01/20  1123 04/06/20  0759   * 120*   BUN 16.0 12   CREATSERUM 1.05* 1.03 Date: 4/1/2020  DATE OF SERVICE: 04.01.2020 CT CHEST (CPT=71250) CLINICAL INDICATION: Other general symptoms and signs. COMPARISON STUDY: CT 12/27/2019.  TECHNIQUE: Helical images of the chest were obtained from the thoracic inlet through the adrenal glands Technologist)  Patient states her shortness of breath started worsening last night. FINDINGS:  Increased left lower lobe consolidation. Cardiac silhouette is unchanged. Pulmonary vasculature is stable. No pneumothorax.   Minimal reticular opacities at

## 2020-04-06 NOTE — H&P
[facility] Hospitalist H&P       CC: Patient presents with:  Cough/URI  Dyspnea STELLA SOB       PCP: Gaston Benitez MD    History of Present Illness:  61y/o F with PMHx asthma, emphysema, HTN, DMII, obesity who is presenting with dyspnea and hypoxia.  She was [facility] Right 9/8/2016    Performed by Chris Knight MD at [facility] MAIN OR   • LEFT PHACOEMULSIFICATION OF CATARACT WITH INTRAOCULAR LENS IMPLANT 20910 Left 1/30/2017    Performed by Angelina Ludwig MD at [place] [address] / Carlos Lyme Dicyclomine HCl 10 MG Oral Cap, Take 10 mg by mouth 2 (two) times daily as needed. , Disp: , Rfl:   gabapentin 300 MG Oral Cap, Take 600 mg by mouth every morning., Disp: , Rfl:   gabapentin 300 MG Oral Cap, Take 900 mg by mouth nightly., Disp: , Rfl:   h Used    Alcohol use:  Yes      Alcohol/week: 5.8 standard drinks      Types: 7 Glasses of wine per week      Frequency: 2-3 times a week      Comment: 1-2 drinks daily       Fam Hx  Family History   Problem Relation Age of Onset   • Breast Cancer Paternal G Date: 3/30/2020  DATE OF SERVICE: 03.30.2020 XR CHEST PA AND LATERAL CLINICAL INFORMATION: Cough. COMPARISON STUDY: Outside portable chest x-ray from [facility] dated 3/14/2020 revealing bibasilar interstitial changes. . TECHNIQUE: PA and left lateral ABDOMEN: Unremarkable OSSEOUS STRUCTURES: No suspicious lytic or blastic lesions. IMPRESSION: 1. Patchy groundglass densities in the periphery of the left lower lobe.  2. Calcified atherosclerosis of the thoracic aorta and coronary arteries COVID-19 pne feeling worse shortness of breath with a cough today. FINDINGS:  Bibasilar increased interstitial opacities most consistent with atelectasis and or scar. No focal consolidation, pleural effusion, or pneumothorax. Atherosclerotic aortic arch.   Postsurg

## 2020-04-06 NOTE — PLAN OF CARE
Patient Aox4. Maintaining O2 sats on 3Lnc. Will wean as tolerated. IS every hour. Patient NSR on tele. Lovenox. Abx. Patient reports loose stools. Hat in toilet. Patient up self. Patient updated on POC. WCTMF.      Problem: Patient/Family Goals  Goal: Patie glucose within target range  - Assess barriers to adequate nutritional intake and initiate nutrition consult as needed  - Instruct patient on self management of diabetes  Outcome: Progressing

## 2020-04-06 NOTE — ED NOTES
Spoke with charge rn in 3rd floor as we are still waiting for the room to be cleaned in 368 for pt to be admitted to the floor. Charge rn will notify housekeeping again for the clean.

## 2020-04-06 NOTE — ED INITIAL ASSESSMENT (HPI)
Pt presents to ED with complaint of STELLA. Reports diagnosed with COVID this week.  History of COPD reports O2 sats at home of 66%

## 2020-04-06 NOTE — CONSULTS
Pulmonary H&P/Consult       NAME: Jessa [place] - ROOM: PIA/PIA - MRN: [id number] - Age: 59year old - :  1955    Date of Admission: 2020  7:01 AM  Admission Diagnosis: COVID-19  COVID-19  Reason for consult: COVID19    History of Present Right    • KNEE TOTAL REPLACEMENT Right 9/8/2016    Performed by Hyacinth Farah MD at [facility] MAIN OR   • LEFT PHACOEMULSIFICATION OF CATARACT WITH INTRAOCULAR LENS IMPLANT [id number] Left 1/30/2017    Performed by Hao Ding MD at [address] [place] [address] on file    Social Needs      Financial resource strain: Not on file      Food insecurity:        Worry: Not on file        Inability: Not on file      Transportation needs:        Medical: Not on file        Non-medical: Not on file    Tobacco Use      Smo ALPRAZolam 0.25 MG Oral Tab, Take 0.25 mg by mouth nightly as needed. , Disp: , Rfl:   Amitriptyline HCl 10 MG Oral Tab, Take 10 mg by mouth nightly., Disp: , Rfl:   Dicyclomine HCl 10 MG Oral Cap, Take 10 mg by mouth 2 (two) times daily as needed. , Disp: 4/7/2020] Hydroxychloroquine Sulfate  200 mg Oral BID   • enoxaparin  40 mg Subcutaneous Daily   • [START ON 4/7/2020] gabapentin  600 mg Oral QAM   • gabapentin  900 mg Oral Nightly   • Montelukast Sodium  10 mg Oral Nightly   • Oxybutynin Chloride  5 mg Alert, cooperative, no distress, appears stated age   Head:    Normocephalic, without obvious abnormality, atraumatic   Eyes:    PERRL, conjunctiva/corneas clear, EOM's intact, both eyes   Throat:   Lips, mucosa, and tongue normal; teeth and gums normal

## 2020-04-07 PROCEDURE — 86140 C-REACTIVE PROTEIN: CPT | Performed by: INTERNAL MEDICINE

## 2020-04-07 PROCEDURE — 82728 ASSAY OF FERRITIN: CPT | Performed by: INTERNAL MEDICINE

## 2020-04-07 PROCEDURE — 80053 COMPREHEN METABOLIC PANEL: CPT | Performed by: INTERNAL MEDICINE

## 2020-04-07 PROCEDURE — 93010 ELECTROCARDIOGRAM REPORT: CPT | Performed by: INTERNAL MEDICINE

## 2020-04-07 PROCEDURE — 87493 C DIFF AMPLIFIED PROBE: CPT | Performed by: INTERNAL MEDICINE

## 2020-04-07 PROCEDURE — 93005 ELECTROCARDIOGRAM TRACING: CPT

## 2020-04-07 PROCEDURE — 85025 COMPLETE CBC W/AUTO DIFF WBC: CPT | Performed by: INTERNAL MEDICINE

## 2020-04-07 PROCEDURE — 82962 GLUCOSE BLOOD TEST: CPT

## 2020-04-07 PROCEDURE — 84132 ASSAY OF SERUM POTASSIUM: CPT | Performed by: INTERNAL MEDICINE

## 2020-04-07 PROCEDURE — 83615 LACTATE (LD) (LDH) ENZYME: CPT | Performed by: INTERNAL MEDICINE

## 2020-04-07 PROCEDURE — 83520 IMMUNOASSAY QUANT NOS NONAB: CPT | Performed by: INTERNAL MEDICINE

## 2020-04-07 RX ORDER — PAROXETINE 10 MG/1
15 TABLET, FILM COATED ORAL EVERY MORNING
Status: DISCONTINUED | OUTPATIENT
Start: 2020-04-07 | End: 2020-04-18

## 2020-04-07 RX ORDER — POTASSIUM CHLORIDE 20 MEQ/1
40 TABLET, EXTENDED RELEASE ORAL EVERY 4 HOURS
Status: COMPLETED | OUTPATIENT
Start: 2020-04-07 | End: 2020-04-07

## 2020-04-07 RX ORDER — ALPRAZOLAM 0.25 MG/1
0.25 TABLET ORAL 2 TIMES DAILY PRN
Status: DISCONTINUED | OUTPATIENT
Start: 2020-04-07 | End: 2020-04-18

## 2020-04-07 NOTE — PROGRESS NOTES
INFECTIOUS DISEASE PROGRESS NOTE    Kaci Vila Rojo Patient Status:  Inpatient    1955 MRN [id number]   [facility] 3SW-A Attending Ab Davis MD   Hosp Day # 1 PCP Jennifer Bhat 1-5 Units, 1-5 Units, Subcutaneous, TID CC and HS  •  Fluticasone Furoate-Vilanterol (BREO ELLIPTA) 200-25 MCG/INH inhaler 1 puff, 1 puff, Inhalation, Daily  •  Umeclidinium Bromide (INCRUSE ELLIPTA) 62.5 MCG/INH inhaler 1 puff, 1 puff, Inhalation, Daily on 04/06/20   1.  BLOOD CULTURE     Status: None (Preliminary result)    Collection Time: 04/06/20  8:00 AM   Result Value Ref Range    Blood Culture Result No Growth 1 Day N/A       Radiology: Xr Chest Pa + Lat Chest (cpt=71046)    Result Date: 3/30/2020 trachea and central bronchi. PET/CT ill-defined groundglass densities at the periphery of the left lower lobe. No confluent consolidation. PLEURA: No significant pleural effusion.  CHEST WALL, AXILLA, AND LOWER NECK: Unremarkable UPPER ABDOMEN: Unremarkable ambulation, up to chair, IS (O2 sats better with deep breaths)    2. Acute hypoxic resp failure due to above    3.  Acute cdiff colitis- started po vanco  - pt denies any abd pain or distention  - explained to pt the diagnosis of Cdiff and need to notify us

## 2020-04-07 NOTE — PROGRESS NOTES
PT NOTED 02 SATS MAINTAINING AT 92% ON 5 LHFNC, STILL UNABLE TO WEAN. DR. CRISSY ESPINOSA WAS PAGED AND UPDATED WITH PT'S SYMPTOMS, VS AND 02 SATS. MD ALSO UPDATED WITH CDIFF ON STOOL. MD WITHOUT NEW ORDERS AT [facility]. WILL CONTINUE TO MONITOR.

## 2020-04-07 NOTE — PROGRESS NOTES
PT AMBULATED IN ROOM FROM BED TO [address] UP IN BATHROOM. OS SATS ON ROOM AIR =81%. PT PLACED ON 4LHFNC, SATS 91%. AFTER WALKING IN BATHROOM, PT REFUSED TO SIT UP ON CHAIR FOR NOW, PT STATED SHE WANTS TO GO BACK TO BED AN D TAKE A NAP. PT NOTED SOB GOING BACK

## 2020-04-07 NOTE — PROGRESS NOTES
RECEIVED CALL FROM [facility], PT IS POSITIVE FOR CDIFF ON STOOL SENT. DR. Dc Gonzalez PAGED AND NOTIFIED AND UPDATED WITH PT'S CONDITION. MD WITH ORDERS FOR VANCOMYCIN 125MG PO EVERY 6 HRS.  DR. Emelia Malhotra WAS ASLO PAGED AND NOTIFIED AND AGREEABLE WITH VANCOMYCIN AS

## 2020-04-07 NOTE — PROGRESS NOTES
Pulmonary Progress Note        NAME: Goldie Hdz [place] - ROOM: 368/368-A - MRN: [id number] - Age: 59year old - : 1955        Last 24hrs: No events overnight, dx'd w/ C.diff now, not using her IS much    OBJECTIVE:   20  0754 20  1055 encouraged use as sats improve w/ deep breathing  2.  COVID19 r/o  -travel hx neg  -fever +  -CT w/ GGOs  -CBC with lymphopenia  -AST/ALT wnl  -Procal nega  -CRP up today,  Ferritin wnl, LDH mildly elevated  -monitor patient closely  -cont plaquenil azithro

## 2020-04-07 NOTE — PLAN OF CARE
Assumed care of Pt. At [address]. Pt. Is Axox4 and on 3 L's of O2 with an O2 sat of 95%. Pt. has diminished bilateral breath sounds. Pt. Is currently afebrile. Pt. Has a strong nonproductive cough. Scheduled Tessalon was given (see MAR).  Pt. Reaches 1250 on the management of diabetes  Outcome: Progressing     Problem: RESPIRATORY - ADULT  Goal: Achieves optimal ventilation and oxygenation  Description  INTERVENTIONS:  - Assess for changes in respiratory status  - Assess for changes in mentation and behavior  - Po

## 2020-04-07 NOTE — DIETARY NOTE
[facility]    NUTRITION INITIAL ASSESSMENT    Pt does not meet malnutrition criteria. NUTRITION DIAGNOSIS/PROBLEM:    Inadequate oral intake related to decreased ability to consume enough PO as evidenced by +MST    NUTRITION INTERVENTION:       1. per kg)  Fluid: ~1 ml/kcal or per MD discretion    MONITOR AND EVALUATE/NUTRITION GOALS:    1. PO intake to meet at least 75% patient nutrition prescription  2. At least 75% intake of oral supplements  3. No signs of skin breakdown  4.  Maintain lean body m

## 2020-04-07 NOTE — PROGRESS NOTES
[facility] Hospitalist Progress Note     Krystal Barrios Patient Status:  Inpatient    1955 MRN [id number]   [facility] 3SW-A Attending Ab Davis MD   Hosp Day # 1 PCP Pura Acosta MD     CC: follow up    SUBJECTIVE:  Requi gabapentin  600 mg Oral QAM   • gabapentin  900 mg Oral Nightly   • Montelukast Sodium  10 mg Oral Nightly   • Oxybutynin Chloride  5 mg Oral BID   • folic acid  734 mcg Oral Daily   • Insulin Aspart Pen  1-5 Units Subcutaneous TID CC and HS   • Fluticason    Outpatient records reviewed confirming patient's medical history and medications.      Time spent: greater than 35 minutes spent in d/w pt/family, coordination of care, and/or d/w staff.      Dayan Oshea MD     [facility] Hospitalist  Pager: [phone]-

## 2020-04-07 NOTE — PLAN OF CARE
RECEIVED PT AT BEDSIDE, PT AWAKE AND ALERT, REPORTS OF BEING VERY TIRED , STILL SOB. 02 SATS 93% ON 4LHFNC. PT ENCOURAGED TO USE INCENTIVE SPIROMETER. PT  WITH NON PRODUCTIVE COUGH.  PT GIVEN TESSALON GERONIMO THIS AM. PT REPORTS OF HAVING ANXIETY AND STILL CHONG Incentive Spirometry  - Assess the need for suctioning and perform as needed  - Assess and instruct to report SOB or any respiratory difficulty  - Respiratory Therapy support as indicated  - Manage/alleviate anxiety  - Monitor for signs/symptoms of CO2 ret

## 2020-04-08 PROCEDURE — 82962 GLUCOSE BLOOD TEST: CPT

## 2020-04-08 PROCEDURE — 82728 ASSAY OF FERRITIN: CPT | Performed by: INTERNAL MEDICINE

## 2020-04-08 PROCEDURE — 85025 COMPLETE CBC W/AUTO DIFF WBC: CPT | Performed by: INTERNAL MEDICINE

## 2020-04-08 PROCEDURE — 93010 ELECTROCARDIOGRAM REPORT: CPT | Performed by: INTERNAL MEDICINE

## 2020-04-08 PROCEDURE — 80053 COMPREHEN METABOLIC PANEL: CPT | Performed by: INTERNAL MEDICINE

## 2020-04-08 PROCEDURE — 83615 LACTATE (LD) (LDH) ENZYME: CPT | Performed by: INTERNAL MEDICINE

## 2020-04-08 PROCEDURE — 93005 ELECTROCARDIOGRAM TRACING: CPT

## 2020-04-08 PROCEDURE — 86140 C-REACTIVE PROTEIN: CPT | Performed by: INTERNAL MEDICINE

## 2020-04-08 RX ORDER — ECHINACEA PURPUREA EXTRACT 125 MG
1 TABLET ORAL
Status: DISCONTINUED | OUTPATIENT
Start: 2020-04-08 | End: 2020-04-18

## 2020-04-08 NOTE — PROGRESS NOTES
Pulmonary Progress Note      NAME: Kamila Gan [place] - ROOM: 368/368-A - MRN: [id number] - Age: 59year old - : 1955    Assessment/Plan:  1.  Acute Hypoxic Resp Failure  -due to [address]  -improving with O2 needs now 3-5 L  -doing well with IS and BS.   Extremity: no edema   Skin: no new rash   Neuro: normal    Recent Labs   Lab 04/08/20  0526   RBC 4.02   HGB 10.7*   HCT 34.0*   MCV 84.6   MCH 26.6   MCHC 31.5   RDW 16.2*   NEPRELIM 3.34   WBC 5.3   .0     Recent Labs   Lab 04/06/20  0759 04

## 2020-04-08 NOTE — PROGRESS NOTES
INFECTIOUS DISEASE PROGRESS NOTE    Kaci Vila Rojo Patient Status:  Inpatient    1955 MRN [id number]   [facility] 3SW-A Attending Ab Davis MD   [address] Day # 2 PCP Jennifer Bhat Glucose-Vitamin C (DEX-4) chewable tab 8 tablet, 8 tablet, Oral, Q15 Min PRN  •  Insulin Aspart Pen (NOVOLOG) 100 UNIT/ML flexpen 1-5 Units, 1-5 Units, Subcutaneous, TID CC and HS  •  Fluticasone Furoate-Vilanterol (BREO ELLIPTA) 200-25 MCG/INH inhaler 1 p 15.00*   NEPTALI 91.6 94.2 120.7   * 255* 261*   DDIMER 0.65*  --   --          Microbiology    Reviewed in EMR,   Hospital Encounter on 04/06/20   1.  BLOOD CULTURE     Status: None (Preliminary result)    Collection Time: 04/06/20  8:00 AM   Result Tracey

## 2020-04-08 NOTE — PLAN OF CARE
Problem: Patient/Family Goals  Goal: Patient/Family Long Term Goal  Description  Patient's Long Term Goal: Discharge home    Interventions:  - O2, inhalers, abx, antivirals  - See additional Care Plan goals for specific interventions   Outcome: Progressi vital signs, rhythm, and trends  - Monitor for bleeding, hypotension and signs of decreased cardiac output  - Evaluate effectiveness of vasoactive medications to optimize hemodynamic stability  - Monitor arterial and/or venous puncture sites for bleeding a

## 2020-04-08 NOTE — PROGRESS NOTES
[facility] Hospitalist Progress Note     Billy James Patient Status:  Inpatient    1955 MRN [id number]   [facility] 3SW-A Attending Berta Mejia MD   Hosp Day # 2 PCP Dmitri Alexander MD     CC: follow up    SUBJECTIVE:  Jackie booth mg Subcutaneous QPM   • gabapentin  600 mg Oral QAM   • gabapentin  900 mg Oral Nightly   • Montelukast Sodium  10 mg Oral Nightly   • Oxybutynin Chloride  5 mg Oral BID   • folic acid  031 mcg Oral Daily   • Insulin Aspart Pen  1-5 Units Subcutaneous TID with patient.     Outpatient records reviewed confirming patient's medical history and medications.      Time spent: greater than 35 minutes spent in d/w pt/family, coordination of care, and/or d/w staff.      Suraj Gar MD     [facility]  Hospitalist  Pa

## 2020-04-09 PROCEDURE — 80053 COMPREHEN METABOLIC PANEL: CPT | Performed by: INTERNAL MEDICINE

## 2020-04-09 PROCEDURE — 85379 FIBRIN DEGRADATION QUANT: CPT | Performed by: INTERNAL MEDICINE

## 2020-04-09 PROCEDURE — 85025 COMPLETE CBC W/AUTO DIFF WBC: CPT | Performed by: INTERNAL MEDICINE

## 2020-04-09 PROCEDURE — 82962 GLUCOSE BLOOD TEST: CPT

## 2020-04-09 PROCEDURE — 83615 LACTATE (LD) (LDH) ENZYME: CPT | Performed by: INTERNAL MEDICINE

## 2020-04-09 PROCEDURE — 82728 ASSAY OF FERRITIN: CPT | Performed by: INTERNAL MEDICINE

## 2020-04-09 PROCEDURE — 86140 C-REACTIVE PROTEIN: CPT | Performed by: INTERNAL MEDICINE

## 2020-04-09 NOTE — PLAN OF CARE
Problem: Patient/Family Goals  Goal: Patient/Family Long Term Goal  Description  Patient's Long Term Goal: Discharge home    Interventions:  - O2, inhalers, abx, antivirals  - See additional Care Plan goals for specific interventions   Outcome: Progressi optimal cardiac output and hemodynamic stability  Description  INTERVENTIONS:  - Monitor vital signs, rhythm, and trends  - Monitor for bleeding, hypotension and signs of decreased cardiac output  - Evaluate effectiveness of vasoactive medications to optim

## 2020-04-09 NOTE — PROGRESS NOTES
[facility] PULMONARY/CRITICAL CARE    S: Pt c/o cough and dyspnea. + Diarrhea.      Meds:  • PARoxetine HCl  15 mg Oral QAM   • vancomycin HCl  125 mg Oral Q6H   • Hydroxychloroquine Sulfate  200 mg Oral BID   • enoxaparin  40 mg Subcutaneous QPM   • gabapentin  6 Lab Results   Component Value Date    PGLU 121 04/08/2020     Recent Labs   Lab 04/06/20  0759   CK 81       Recent Labs   Lab 04/06/20  0759   PCT <0.05     Recent Labs   Lab 04/06/20  0759 04/07/20  0509 04/08/20  0526 04/09/20  0607   NEPTALI 91.6 94.2 12

## 2020-04-09 NOTE — PLAN OF CARE
Patient AOX4. Maintaining o2 sats on 3L nc. Cough managed with PRN medications. IS used every hour. Encouraged prone 3x daily while awake. Patient NSR on tele. Lovenox. Oral Vanco and plaquenil. Patient with 2x loose stools today. Voids.  Patient denies cari breathe, Incentive Spirometry  - Assess the need for suctioning and perform as needed  - Assess and instruct to report SOB or any respiratory difficulty  - Respiratory Therapy support as indicated  - Manage/alleviate anxiety  - Monitor for signs/symptoms o

## 2020-04-09 NOTE — PROGRESS NOTES
[facility] Hospitalist Progress Note     Cee Puga Patient Status:  Inpatient    1955 MRN [id number]   [facility] 3SW-A Attending Marv Huggins MD   [facility] Day # 3 PCP Yazmin Ramirez MD     CC: follow up    SUBJECTIVE:  Reg booth gabapentin  600 mg Oral QAM   • gabapentin  900 mg Oral Nightly   • Montelukast Sodium  10 mg Oral Nightly   • Oxybutynin Chloride  5 mg Oral BID   • folic acid  727 mcg Oral Daily   • Insulin Aspart Pen  1-5 Units Subcutaneous TID CC and HS   • Fluticason

## 2020-04-09 NOTE — PROGRESS NOTES
INFECTIOUS DISEASE PROGRESS NOTE    Adalidsilverio Yañez [place] Patient Status:  Inpatient    1955 MRN [id number]   [facility] 3SW-A Attending Judith Adler MD   Hosp Day # 3 PCP Mirian Loaiza Intravenous, Q15 Min PRN **OR** glucose (DEX4) oral liquid 30 g, 30 g, Oral, Q15 Min PRN **OR** Glucose-Vitamin C (DEX-4) chewable tab 8 tablet, 8 tablet, Oral, Q15 Min PRN  •  Insulin Aspart Pen (NOVOLOG) 100 UNIT/ML flexpen 1-5 Units, 1-5 Units, Subcutan Inflammatory Markers  Recent Labs   Lab 04/06/20  0759 04/07/20  0509 04/08/20  0526 04/09/20  0607   CRP 10.70* 13.10* 15.00* 14.40*   NEPTALI 91.6 94.2 120.7 163.3   * 255* 261* 323*   DDIMER 0.65*  --   --  1.08*         Microbiology    Reviewe

## 2020-04-09 NOTE — PLAN OF CARE
Problem: Patient/Family Goals  Goal: Patient/Family Long Term Goal  Description  Patient's Long Term Goal: Discharge home    Interventions:  - O2, inhalers, abx, antivirals  - See additional Care Plan goals for specific interventions   Outcome: Progressi stability  Description  INTERVENTIONS:  - Monitor vital signs, rhythm, and trends  - Monitor for bleeding, hypotension and signs of decreased cardiac output  - Evaluate effectiveness of vasoactive medications to optimize hemodynamic stability  - Monitor ar

## 2020-04-09 NOTE — PROGRESS NOTES
Problem: +COVID     Data: Pt A&Ox4. 3L O2 via nasal cannula. RA Basline. . Pt c/o strong cough, treated with PRn cough medication. Telemetry monitored. Voids. Pt has no c/o pain at this time. Up as tolerated.  Pt and  updated on plan of care and v

## 2020-04-10 PROCEDURE — 82962 GLUCOSE BLOOD TEST: CPT

## 2020-04-10 NOTE — DIETARY NOTE
[facility]    NUTRITION ASSESSMENT    Pt does not meet malnutrition criteria. NUTRITION DIAGNOSIS/PROBLEM:    Inadequate oral intake related to decreased ability to consume enough PO as evidenced by +MST    NUTRITION INTERVENTION:       1.  Meal and per RN    NUTRITION PRESCRIPTION:IBW  Calories: 1350-1620calories/day (25-30calories per kg)  Protein: 81-108grams protein/day (1.5-2 grams protein per kg)  Fluid: ~1 ml/kcal or per MD discretion    MONITOR AND EVALUATE/NUTRITION GOALS:    1. PO intake to

## 2020-04-10 NOTE — PROGRESS NOTES
[facility] PULMONARY/CRITICAL CARE    S: Pt still c/o cough. She is still having diarrhea. She's not having much dyspnea.      Meds:  • PARoxetine HCl  15 mg Oral QAM   • vancomycin HCl  125 mg Oral Q6H   • Hydroxychloroquine Sulfate  200 mg Oral BID   • enoxapari --  7.0 7.5        Lab Results   Component Value Date    PGLU 91 04/09/2020     Recent Labs   Lab 04/06/20  0759   CK 81       Recent Labs   Lab 04/06/20  0759   PCT <0.05     Recent Labs   Lab 04/06/20  0759 04/07/20  0509 04/08/20  0526 04/09/20  5540

## 2020-04-10 NOTE — PLAN OF CARE
Received patient at 0730. Alert and Oriented x4. Tele Rhythm NSR. O2 saturation 89-92% on 2.5 L NC. Tried to wean to 2 L, but dropped down to 85%. Breath sounds diminished. Strong cough noted, SOBwE noted. Bed is locked and in low position.  Call light and changes in mentation and behavior  - Position to facilitate oxygenation and minimize respiratory effort  - Oxygen supplementation based on oxygen saturation or ABGs  - Provide Smoking Cessation handout, if applicable  - Encourage broncho-pulmonary hygiene

## 2020-04-10 NOTE — PROGRESS NOTES
INFECTIOUS DISEASE PROGRESS NOTE    Almita Rojo Patient Status:  Inpatient    1955 MRN [id number]   [facility] 3SW-A Attending William Lee MD   [facility] Day # 4 CRISTY Zamora Oral, Q15 Min PRN **OR** Glucose-Vitamin C (DEX-4) chewable tab 8 tablet, 8 tablet, Oral, Q15 Min PRN  •  Insulin Aspart Pen (NOVOLOG) 100 UNIT/ML flexpen 1-5 Units, 1-5 Units, Subcutaneous, TID CC and HS  •  Fluticasone Furoate-Vilanterol (BREO ELLIPTA) 2 04/08/20  0526 04/09/20  0607   CRP 10.70* 13.10* 15.00* 14.40*   NEPTALI 91.6 94.2 120.7 163.3   * 255* 261* 323*   DDIMER 0.65*  --   --  1.08*         Microbiology    Reviewed in EMR,   Hospital Encounter on 04/06/20   1.  BLOOD CULTURE     Status: No

## 2020-04-10 NOTE — PROGRESS NOTES
[facility] Hospitalist Progress Note     Sydnee Velazco Patient Status:  Inpatient    1955 MRN [id number]   [facility] 3SW-A Attending Rachel Fonseca MD   [address] Day # 4 PCP Milly Mcfadden MD     CC: follow up    SUBJECTIVE:    Afe Oral Q6H   • Hydroxychloroquine Sulfate  200 mg Oral BID   • enoxaparin  40 mg Subcutaneous QPM   • gabapentin  600 mg Oral QAM   • gabapentin  900 mg Oral Nightly   • Montelukast Sodium  10 mg Oral Nightly   • Oxybutynin Chloride  5 mg Oral BID   • folic

## 2020-04-11 ENCOUNTER — APPOINTMENT (OUTPATIENT)
Dept: GENERAL RADIOLOGY | Facility: HOSPITAL | Age: 65
DRG: 177 | End: 2020-04-11
Attending: INTERNAL MEDICINE
Payer: COMMERCIAL

## 2020-04-11 PROCEDURE — 82728 ASSAY OF FERRITIN: CPT | Performed by: INTERNAL MEDICINE

## 2020-04-11 PROCEDURE — 86140 C-REACTIVE PROTEIN: CPT | Performed by: INTERNAL MEDICINE

## 2020-04-11 PROCEDURE — 80053 COMPREHEN METABOLIC PANEL: CPT | Performed by: INTERNAL MEDICINE

## 2020-04-11 PROCEDURE — 82962 GLUCOSE BLOOD TEST: CPT

## 2020-04-11 PROCEDURE — 71045 X-RAY EXAM CHEST 1 VIEW: CPT | Performed by: INTERNAL MEDICINE

## 2020-04-11 PROCEDURE — C9057 INJ CETIRIZINE HYDROCHLORIDE: HCPCS | Performed by: INTERNAL MEDICINE

## 2020-04-11 PROCEDURE — 83615 LACTATE (LD) (LDH) ENZYME: CPT | Performed by: INTERNAL MEDICINE

## 2020-04-11 RX ORDER — AMITRIPTYLINE HYDROCHLORIDE 10 MG/1
10 TABLET, FILM COATED ORAL NIGHTLY
Status: DISCONTINUED | OUTPATIENT
Start: 2020-04-11 | End: 2020-04-18

## 2020-04-11 RX ORDER — FUROSEMIDE 10 MG/ML
20 INJECTION INTRAMUSCULAR; INTRAVENOUS ONCE
Status: COMPLETED | OUTPATIENT
Start: 2020-04-11 | End: 2020-04-11

## 2020-04-11 RX ORDER — LISINOPRIL 5 MG/1
5 TABLET ORAL NIGHTLY
Status: DISCONTINUED | OUTPATIENT
Start: 2020-04-11 | End: 2020-04-18

## 2020-04-11 RX ORDER — DIPHENHYDRAMINE HYDROCHLORIDE, ZINC ACETATE 2; .1 G/100G; G/100G
1 CREAM TOPICAL 3 TIMES DAILY PRN
Status: DISCONTINUED | OUTPATIENT
Start: 2020-04-11 | End: 2020-04-18

## 2020-04-11 RX ORDER — CETIRIZINE HYDROCHLORIDE 10 MG/1
10 TABLET ORAL NIGHTLY
Status: DISCONTINUED | OUTPATIENT
Start: 2020-04-11 | End: 2020-04-18

## 2020-04-11 NOTE — PLAN OF CARE
Problem: RESPIRATORY - ADULT  Goal: Achieves optimal ventilation and oxygenation  Description  INTERVENTIONS:  - Assess for changes in respiratory status  - Assess for changes in mentation and behavior  - Position to facilitate oxygenation and minimize r coronary artery perfusion - ex.  Angina  - Evaluate fluid balance, assess for edema, trend weights  Outcome: Progressing  Goal: Absence of cardiac arrhythmias or at baseline  Description  INTERVENTIONS:  - Continuous cardiac monitoring, monitor vital signs,

## 2020-04-11 NOTE — PLAN OF CARE
Patient A+Ox4. Denies any pain. On 2L-o2 and sats in low 90's. SOB with exertion. Has coughing spells and productive cough at times. Scheduled tessalon pearls given and PRN cough syrup. On tele SR. Up as tolerated.  Oxygen level hanging below 90, so O2 bump quality of pulses, skin color and temperature  - Assess for signs of decreased coronary artery perfusion - ex.  Angina  - Evaluate fluid balance, assess for edema, trend weights  Outcome: Progressing

## 2020-04-11 NOTE — PROGRESS NOTES
[facility] Hospitalist Progress Note     [facility]      SUBJECTIVE:  Patient feeling more short of breath today  Coughing more, but having difficulty with sputum production    OBJECTIVE:  Temp:  [97.9 °F (36.6 °C)-98.9 °F (37.2 °C)] 98.9 °F (37.2 °C)  Pul INFORMATION: Cough. COMPARISON STUDY: Outside portable chest x-ray from [facility] dated 3/14/2020 revealing bibasilar interstitial changes. . TECHNIQUE: PA and left lateral views of the chest, 2 views.  FINDINGS:Cardiac size and pulmonary vasculature a IMPRESSION: 1. Patchy groundglass densities in the periphery of the left lower lobe.  2. Calcified atherosclerosis of the thoracic aorta and coronary arteries COVID-19 pneumonia imaging classification: Indeterminate Imaging features can be seen with COV increased interstitial opacities most consistent with atelectasis and or scar. No focal consolidation, pleural effusion, or pneumothorax. Atherosclerotic aortic arch. Postsurgical changes with an anchor involves the right humeral head, stable.   Stella Perdomo (BREO ELLIPTA) 200-25 MCG/INH inhaler 1 puff, 1 puff, Inhalation, Daily  Umeclidinium Bromide (INCRUSE ELLIPTA) 62.5 MCG/INH inhaler 1 puff, 1 puff, Inhalation, Daily  Albuterol Sulfate  (90 Base) MCG/ACT inhaler 2 puff, 2 puff, Inhalation, Q6H PRN family by bedside.     Susanne Heimlich  Internal Medicine  [facility]

## 2020-04-11 NOTE — PROGRESS NOTES
Kathryn Orozco A [place] Patient Status:  Inpatient    1955 MRN [id number]   [facility] 3SW-A Attending Farhana Mallory MD   [facility] Day # 5 PCP Zhen Henao MD     SUBJECTIVE:feels more SOB today     OBJECTIVE: C (DEX-4) chewable tab 4 tablet, 4 tablet, Oral, Q15 Min PRN **OR** dextrose 50 % injection 50 mL, 50 mL, Intravenous, Q15 Min PRN **OR** glucose (DEX4) oral liquid 30 g, 30 g, Oral, Q15 Min PRN **OR** Glucose-Vitamin C (DEX-4) chewable tab 8 tablet, 8 tab 04/06/20  0759  04/08/20  0526 04/09/20  0607 04/11/20  0455   CRP 10.70*   < > 15.00* 14.40* 9.29*   NEPTALI 91.6   < > 120.7 163.3 134.4   *   < > 261* 323* 230   DDIMER 0.65*  --   --  1.08*  --     < > = values in this interval not displayed.

## 2020-04-12 PROCEDURE — 85025 COMPLETE CBC W/AUTO DIFF WBC: CPT | Performed by: INTERNAL MEDICINE

## 2020-04-12 PROCEDURE — 83735 ASSAY OF MAGNESIUM: CPT | Performed by: INTERNAL MEDICINE

## 2020-04-12 PROCEDURE — C9057 INJ CETIRIZINE HYDROCHLORIDE: HCPCS | Performed by: INTERNAL MEDICINE

## 2020-04-12 PROCEDURE — 86140 C-REACTIVE PROTEIN: CPT | Performed by: INTERNAL MEDICINE

## 2020-04-12 PROCEDURE — 82962 GLUCOSE BLOOD TEST: CPT

## 2020-04-12 PROCEDURE — 80053 COMPREHEN METABOLIC PANEL: CPT | Performed by: INTERNAL MEDICINE

## 2020-04-12 RX ORDER — FUROSEMIDE 10 MG/ML
40 INJECTION INTRAMUSCULAR; INTRAVENOUS ONCE
Status: COMPLETED | OUTPATIENT
Start: 2020-04-12 | End: 2020-04-12

## 2020-04-12 NOTE — PROGRESS NOTES
[facility] Hospitalist Progress Note     [facility]      SUBJECTIVE:  Feeling a little better  Still on 4 L NC today  NO fevers  Some diarrhea yesterday, no BMs today    OBJECTIVE:  Temp:  [97.8 °F (36.6 °C)-98.8 °F (37.1 °C)] 98.4 °F (36.9 °C)  Pulse:  [7 Imaging:  Xr Chest Pa + Lat Chest (cpt=71046)    Result Date: 3/30/2020  DATE OF SERVICE: 03.30.2020 XR CHEST PA AND LATERAL CLINICAL INFORMATION: Cough.  COMPARISON STUDY: Outside portable chest x-ray from [facility] dated 3/14/2020 revealing biba CHEST WALL, AXILLA, AND LOWER NECK: Unremarkable UPPER ABDOMEN: Unremarkable OSSEOUS STRUCTURES: No suspicious lytic or blastic lesions. IMPRESSION: 1. Patchy groundglass densities in the periphery of the left lower lobe.  2. Calcified atherosclerosis o with pneumonia 4 days ago and is on predisone. She is feeling worse shortness of breath with a cough today. FINDINGS:  Bibasilar increased interstitial opacities most consistent with atelectasis and or scar.   No focal consolidation, pleural effusion, or Min PRN    Or  glucose (DEX4) oral liquid 30 g, 30 g, Oral, Q15 Min PRN    Or  Glucose-Vitamin C (DEX-4) chewable tab 8 tablet, 8 tablet, Oral, Q15 Min PRN  Insulin Aspart Pen (NOVOLOG) 100 UNIT/ML flexpen 1-5 Units, 1-5 Units, Subcutaneous, TID CC and HS anti-HTNs     # DMII  - hold PO meds  - accuchecks, ISS     # Psych   - resume paroxetine  - hold amitryptiline for now to prevent Qtc prolongation in setting of above meds  - PRN xanax    Prophy:  DVT: lovenox    Dispo: admitted for COVID PNA    Questions

## 2020-04-12 NOTE — PROGRESS NOTES
[address] Patient Status:  Inpatient    1955 MRN [id number]   [facility] 3SW-A Attending Preethi Gardiner MD   [facility] Day # 6 PCP Bandar Koroma MD     SUBJECTIVE:  Better today than yesterday.  Felt t Daily  •  glucose (DEX4) oral liquid 15 g, 15 g, Oral, Q15 Min PRN **OR** Glucose-Vitamin C (DEX-4) chewable tab 4 tablet, 4 tablet, Oral, Q15 Min PRN **OR** dextrose 50 % injection 50 mL, 50 mL, Intravenous, Q15 Min PRN **OR** glucose (DEX4) oral liquid 3 INR 1.03 12/08/2015         COVID-19 Lab Results    COVID-19  No results found for: COVID19    Pro-Calcitonin  Recent Labs   Lab 04/06/20  0759   PCT <0.05       Cardiac  No results for input(s): TROP, PBNP in the last 168 hours.     Creatinine Kinase  R

## 2020-04-12 NOTE — PLAN OF CARE
Patient A+Ox4 Denies any pain. Remains on 4L-O2 via NC. Strong productive cough at times, PRN cough syrup and scheduled tessalon. Patient reported to loose stools this shift. On PO vanco for cdiff.  Blood sugar 100 and no coverage needed      Problem: Diabe support as indicated  - Manage/alleviate anxiety  - Monitor for signs/symptoms of CO2 retention  Outcome: Not Progressing

## 2020-04-12 NOTE — PLAN OF CARE
Pt AOx4. Up in room. Good appetite. VSS. IV lasix ordered and administered. Pt states she feels more awake today. Only one stool today. Still on 4L O2, will go up to 95% but also dip down to high 80s.

## 2020-04-13 PROCEDURE — 85025 COMPLETE CBC W/AUTO DIFF WBC: CPT | Performed by: INTERNAL MEDICINE

## 2020-04-13 PROCEDURE — 82962 GLUCOSE BLOOD TEST: CPT

## 2020-04-13 PROCEDURE — 80053 COMPREHEN METABOLIC PANEL: CPT | Performed by: INTERNAL MEDICINE

## 2020-04-13 PROCEDURE — C9057 INJ CETIRIZINE HYDROCHLORIDE: HCPCS | Performed by: INTERNAL MEDICINE

## 2020-04-13 RX ORDER — COLCHICINE 0.6 MG/1
1.2 TABLET ORAL ONCE
Status: COMPLETED | OUTPATIENT
Start: 2020-04-13 | End: 2020-04-13

## 2020-04-13 RX ORDER — POTASSIUM CHLORIDE 20 MEQ/1
40 TABLET, EXTENDED RELEASE ORAL ONCE
Status: COMPLETED | OUTPATIENT
Start: 2020-04-13 | End: 2020-04-13

## 2020-04-13 NOTE — PLAN OF CARE
Problem: Covid19 +, C-diff  Data: Ax04. On telemetry,NSR.  , oxygen 4-5L . 02 saturation 89-94%. Strong productive cough. Diminished breath sounds. Present bowel sounds. Rashes on mid back , prn benadryl applied. No episodes of diarrhea reported.  Adin Gains arrhythmias or at baseline  Description  INTERVENTIONS:  - Continuous cardiac monitoring, monitor vital signs, obtain 12 lead EKG if indicated  - Evaluate effectiveness of antiarrhythmic and heart rate control medications as ordered  - Initiate emergency m

## 2020-04-13 NOTE — PROGRESS NOTES
[facility] Hospitalist Progress Note     [facility]      SUBJECTIVE:  Continues to feel like she is improving  On 3 L NC this afternoon  No fevers    OBJECTIVE:  Temp:  [98 °F (36.7 °C)-98.7 °F (37.1 °C)] 98 °F (36.7 °C)  Pulse:  [73-93] 93  Resp:  [15-25] XR CHEST PA AND LATERAL CLINICAL INFORMATION: Cough. COMPARISON STUDY: Outside portable chest x-ray from [facility] dated 3/14/2020 revealing bibasilar interstitial changes. . TECHNIQUE: PA and left lateral views of the chest, 2 views.  FINDINGS:Cardiac suspicious lytic or blastic lesions. IMPRESSION: 1. Patchy groundglass densities in the periphery of the left lower lobe.  2. Calcified atherosclerosis of the thoracic aorta and coronary arteries COVID-19 pneumonia imaging classification: Indeterminate today.    FINDINGS:  Bibasilar increased interstitial opacities most consistent with atelectasis and or scar. No focal consolidation, pleural effusion, or pneumothorax. Atherosclerotic aortic arch.   Postsurgical changes with an anchor involves the right (DEX-4) chewable tab 8 tablet, 8 tablet, Oral, Q15 Min PRN  Insulin Aspart Pen (NOVOLOG) 100 UNIT/ML flexpen 1-5 Units, 1-5 Units, Subcutaneous, TID CC and HS  Fluticasone Furoate-Vilanterol (BREO ELLIPTA) 200-25 MCG/INH inhaler 1 puff, 1 puff, Inhalation, hold PO meds  - accuchecks, ISS     # Psych   - resume paroxetine  - hold amitryptiline for now to prevent Qtc prolongation in setting of above meds  - PRN xanax    Prophy:  DVT: lovenox    Dispo: admitted for COVID PNA    Questions/concerns were discussed

## 2020-04-13 NOTE — CM/SW NOTE
Care Progression Note:  Active Acute Medical Issue: COVID-19   Completed zithro and plaquenil course  Currently on 3-4 L, sats in low 90's  Other Contributing Medical Factors/Dx. : COPD, asthma  C diff on oral vanco  Length of stay: 7  Avoidable Delays: Lab

## 2020-04-13 NOTE — PROGRESS NOTES
INFECTIOUS DISEASE PROGRESS NOTE    Vishal Javed Rojo Patient Status:  Inpatient    1955 MRN [id number]   [facility] 3SW-A Attending Chichi Sweeney MD   [facility] Day # 7 PCP Brody Castro **OR** glucose (DEX4) oral liquid 30 g, 30 g, Oral, Q15 Min PRN **OR** Glucose-Vitamin C (DEX-4) chewable tab 8 tablet, 8 tablet, Oral, Q15 Min PRN  •  Insulin Aspart Pen (NOVOLOG) 100 UNIT/ML flexpen 1-5 Units, 1-5 Units, Subcutaneous, TID CC and HS  •  F 9.29* 6.24*   NEPTALI 120.7 163.3 134.4  --    * 323* 230  --    DDIMER  --  1.08*  --   --          Microbiology    Reviewed in EMR,   Hospital Encounter on 04/06/20   1.  BLOOD CULTURE     Status: None    Collection Time: 04/06/20  8:00 AM   Result Tracey

## 2020-04-13 NOTE — PROGRESS NOTES
[address] [facility] Patient Status:  Inpatient    1955 MRN [id number]   [facility] 3SW-A Attending Gilbert Cobb MD   [facility] Day # 7 PCP Yayo Myles MD     Pulm / Critical Care Progress Note     S: pt sta atraumatic. Lungs: basilar crackles   Chest wall: No tenderness or deformity. Heart: Regular rate and rhythm, normal S1S2, no murmur. Abdomen: soft, non-tender, non-distended, positive BS.    Extremity: no edema     Recent Labs   Lab 04/09/20  0607 04 rounds of steroids last month. Hold on additional prednisone for now  -BD protocol-   -Breo/Incruse  -singulair  4. DVT Proph - LMWH  5.  Dispo - full code  -will follow       Andrea Dewitt M.D.  [facility] pulmonary / critical care  4/13/2020  11:38 AM

## 2020-04-14 PROCEDURE — 82962 GLUCOSE BLOOD TEST: CPT

## 2020-04-14 PROCEDURE — 94667 MNPJ CHEST WALL 1ST: CPT

## 2020-04-14 PROCEDURE — C9057 INJ CETIRIZINE HYDROCHLORIDE: HCPCS | Performed by: INTERNAL MEDICINE

## 2020-04-14 PROCEDURE — 85025 COMPLETE CBC W/AUTO DIFF WBC: CPT | Performed by: INTERNAL MEDICINE

## 2020-04-14 PROCEDURE — 82728 ASSAY OF FERRITIN: CPT | Performed by: INTERNAL MEDICINE

## 2020-04-14 PROCEDURE — 86140 C-REACTIVE PROTEIN: CPT | Performed by: INTERNAL MEDICINE

## 2020-04-14 PROCEDURE — 80053 COMPREHEN METABOLIC PANEL: CPT | Performed by: INTERNAL MEDICINE

## 2020-04-14 RX ORDER — COLCHICINE 0.6 MG/1
0.6 TABLET ORAL 2 TIMES DAILY
Status: DISCONTINUED | OUTPATIENT
Start: 2020-04-14 | End: 2020-04-18

## 2020-04-14 NOTE — PLAN OF CARE
Problem: Covid19 +, C-diff  Data: Ax04. On telemetry,NSR.  , On 2L . 02 saturation 90-93%. Strong productive cough. Diminished breath sounds. Afebrile. Reported right toe pain due to gout. Prn Tylenol and one time dose of colchicine given.  Rashes on mid consult as needed  - Instruct patient on self management of diabetes  Outcome: Progressing     Problem: RESPIRATORY - ADULT  Goal: Achieves optimal ventilation and oxygenation  Description  INTERVENTIONS:  - Assess for changes in respiratory status  - Asse

## 2020-04-14 NOTE — CM/SW NOTE
04/14/20 1300   CM/SW Screening   Referral Source    Information Source Chart review;Nursing rounds   Patient's Mental Status Alert;Oriented   Patient lives with Spouse   Patient Status Prior to Admission   Independent with ADLs and Mobility

## 2020-04-14 NOTE — PROGRESS NOTES
[facility] Hospitalist Progress Note     [facility]      SUBJECTIVE:  On 2-3 L NC  afebrile    OBJECTIVE:  Temp:  [97.8 °F (36.6 °C)-98.7 °F (37.1 °C)] 97.8 °F (36.6 °C)  Pulse:  [75-93] 77  Resp:  [16-22] 22  BP: ()/(51-82) 109/53    Intake/Output: revealing bibasilar interstitial changes. . TECHNIQUE: PA and left lateral views of the chest, 2 views. FINDINGS:Cardiac size and pulmonary vasculature are normal. Lungs are fully expanded and look clear.  Linear interstitial changes in the lung bases bilate atherosclerosis of the thoracic aorta and coronary arteries COVID-19 pneumonia imaging classification: Indeterminate Imaging features can be seen with COVID-19 pneumonia though are nonspecific and can occur with a variety of infectious and noninfectious pr pleural effusion, or pneumothorax. Atherosclerotic aortic arch. Postsurgical changes with an anchor involves the right humeral head, stable. CONCLUSION:  No focal consolidation.     Dictated by: Ephraim Santos MD on 3/14/2020 at 8:19 PM     Finalized by: Marco A Franco UNIT/ML flexpen 1-5 Units, 1-5 Units, Subcutaneous, TID CC and HS  Fluticasone Furoate-Vilanterol (BREO ELLIPTA) 200-25 MCG/INH inhaler 1 puff, 1 puff, Inhalation, Daily  Umeclidinium Bromide (INCRUSE ELLIPTA) 62.5 MCG/INH inhaler 1 puff, 1 puff, Inhalatio with [address] [facility]  Internal Medicine  [facility] Hospitalist

## 2020-04-14 NOTE — PROGRESS NOTES
INFECTIOUS DISEASE PROGRESS NOTE    Kaci Rojo Patient Status:  Inpatient    1955 MRN [id number]   [facility] 3SW-A Attending Ab Davis MD   [facility] Day # 8 CRISTY Bhat chewable tab 4 tablet, 4 tablet, Oral, Q15 Min PRN **OR** dextrose 50 % injection 50 mL, 50 mL, Intravenous, Q15 Min PRN **OR** glucose (DEX4) oral liquid 30 g, 30 g, Oral, Q15 Min PRN **OR** Glucose-Vitamin C (DEX-4) chewable tab 8 tablet, 8 tablet, Oral, hours.    Inflammatory Markers  Recent Labs   Lab 04/08/20  0526 04/09/20  0607 04/11/20  0455 04/12/20  0538 04/14/20  0517   CRP 15.00* 14.40* 9.29* 6.24* 3.63*   NEPTALI 120.7 163.3 134.4  --  92.8   * 323* 230  --   --    DDIMER  --  1.08*  --   -- PRN from my standpoint    D/w other MDs  Will follow    Stephanie Noble

## 2020-04-14 NOTE — PLAN OF CARE
Pt is aox4, VSS, afebrile. 3L O2, saturating well. Pt desaturated when she is talking. Tele NSR, lovenox, electrolyte protocol. Up self. QIORI accuchludwig, sugars have been in 90's. Will change Iv dressing today.  Need home O2 eval before possible discharge scar stability  Description  INTERVENTIONS:  - Monitor vital signs, rhythm, and trends  - Monitor for bleeding, hypotension and signs of decreased cardiac output  - Evaluate effectiveness of vasoactive medications to optimize hemodynamic stability  - Monitor ar

## 2020-04-14 NOTE — PROGRESS NOTES
[facility] Pulmonary, Critical Care and Sleep    [facility] Patient Status:  Inpatient    1955 MRN [id number]   [facility] 3SW-A Attending Preethi Gardiner MD   [facility] Day # 8 PCP Bandar Koroma MD       Date of Admission:  bilaterally. Abd: Nontender, non distended. Ext: No edema. Skin: No rashes.       Recent Labs   Lab 04/12/20  0539 04/13/20  0527 04/14/20  0517   WBC 6.7 7.4 8.2   HGB 10.4* 9.9* 9.8*   HCT 33.7* 31.8* 32.8*   .0* 554.0* 514.0*     Recent Labs Home O2 evaluation in am.     My best regards,     Tremaine Hernández MD  Pulmonary, Critical Care and Sleep Medicine.

## 2020-04-15 PROCEDURE — 80053 COMPREHEN METABOLIC PANEL: CPT | Performed by: INTERNAL MEDICINE

## 2020-04-15 PROCEDURE — C9057 INJ CETIRIZINE HYDROCHLORIDE: HCPCS | Performed by: INTERNAL MEDICINE

## 2020-04-15 PROCEDURE — 82962 GLUCOSE BLOOD TEST: CPT

## 2020-04-15 PROCEDURE — 85025 COMPLETE CBC W/AUTO DIFF WBC: CPT | Performed by: INTERNAL MEDICINE

## 2020-04-15 RX ORDER — BENZONATATE 200 MG/1
200 CAPSULE ORAL 3 TIMES DAILY
Qty: 21 CAPSULE | Refills: 0 | Status: SHIPPED | OUTPATIENT
Start: 2020-04-15 | End: 2020-06-19

## 2020-04-15 RX ORDER — CODEINE PHOSPHATE AND GUAIFENESIN 10; 100 MG/5ML; MG/5ML
10 SOLUTION ORAL EVERY 6 HOURS PRN
Qty: 118 ML | Refills: 0 | Status: SHIPPED | OUTPATIENT
Start: 2020-04-15 | End: 2020-04-22 | Stop reason: ALTCHOICE

## 2020-04-15 RX ORDER — IBUPROFEN 400 MG/1
400 TABLET ORAL EVERY 6 HOURS PRN
Status: DISCONTINUED | OUTPATIENT
Start: 2020-04-15 | End: 2020-04-18

## 2020-04-15 RX ORDER — COLCHICINE 0.6 MG/1
0.6 TABLET ORAL 2 TIMES DAILY
Qty: 30 TABLET | Refills: 0 | Status: SHIPPED | OUTPATIENT
Start: 2020-04-15 | End: 2020-04-22 | Stop reason: ALTCHOICE

## 2020-04-15 NOTE — PLAN OF CARE
Problem: Patient/Family Goals  Goal: Patient/Family Long Term Goal  Description  Patient's Long Term Goal: Discharge home    Interventions:  - O2, inhalers, abx, antivirals  - See additional Care Plan goals for specific interventions   4/15/2020 1813 by changes in mentation and behavior  - Position to facilitate oxygenation and minimize respiratory effort  - Oxygen supplementation based on oxygen saturation or ABGs  - Provide Smoking Cessation handout, if applicable  - Encourage broncho-pulmonary hygiene

## 2020-04-15 NOTE — PROGRESS NOTES
SPO2% on Room Air at rest: 89%      SPO2% Ambulation on Room Air: 86%      SPO2% Ambulation on O2: 94% On 3 Liters per Minute, was able to titrate down to 1L and patient saturating at 92%.

## 2020-04-15 NOTE — PLAN OF CARE
aox4, VSS, afebrile. 4LO2, saturating well. Pt desaturated when she is talking. Tele NSR, lovenox, electrolyte protocol. Up self. QID accucheck, sugars have been stable. Will do home O2 eval today. IV saline locked. Regular diet tolerating well.  Pt self pr respiratory difficulty  - Respiratory Therapy support as indicated  - Manage/alleviate anxiety  - Monitor for signs/symptoms of CO2 retention  Outcome: Progressing     Problem: CARDIOVASCULAR - ADULT  Goal: Maintains optimal cardiac output and hemodynamic

## 2020-04-15 NOTE — PROGRESS NOTES
INFECTIOUS DISEASE PROGRESS NOTE    Thang Martin [place] Patient Status:  Inpatient    1955 MRN [id number]   [facility] 3SW-A Attending Juan Antonio Paredes MD   [facility] Day # 9 PCP Lashae Crow chewable tab 4 tablet, 4 tablet, Oral, Q15 Min PRN **OR** dextrose 50 % injection 50 mL, 50 mL, Intravenous, Q15 Min PRN **OR** glucose (DEX4) oral liquid 30 g, 30 g, Oral, Q15 Min PRN **OR** Glucose-Vitamin C (DEX-4) chewable tab 8 tablet, 8 tablet, Oral, hours.    Inflammatory Markers  Recent Labs   Lab 04/09/20  0607 04/11/20  0455 04/12/20  0538 04/14/20  0517   CRP 14.40* 9.29* 6.24* 3.63*   NEPTALI 163.3 134.4  --  92.8   * 230  --   --    DDIMER 1.08*  --   --   --          Microbiology    Reviewed home today from my standpoint. Will have her complete course of po annyo at home.      Ariana Doyle Normal muscle tone/strength

## 2020-04-15 NOTE — PROGRESS NOTES
[facility] PULMONARY/CRITICAL CARE    S: Pt is feeling better. Still has diarrhea. Overall improved.      Meds:  • colchicine  0.6 mg Oral BID   • Amitriptyline HCl  10 mg Oral Nightly   • cetirizine  10 mg Oral Nightly   • lisinopril  5 mg Oral Nightly   • PARoxe ALKPHO 70 73 82   AST 32 35 40*   ALT 28 30 33   BILT 0.3 0.3 0.3   TP 7.2 7.1 7.4     Lab Results   Component Value Date    PGLU 89 04/15/2020     Recent Labs   Lab 04/09/20  0607 04/11/20  0455 04/12/20  0538 04/14/20  0517   NEPTALI 163.3 134.4  --  92.8

## 2020-04-15 NOTE — CM/SW NOTE
Per unit RN, pt will need home O2. SW sent referral to [facility]. They are reviewing the referral and needed form signed by MD. Will follow up. [facility]#[phone]    Form faxed back to [facility] at [phone], awaiting response regarding the home O2.  Form also

## 2020-04-15 NOTE — PLAN OF CARE
Patient is alert and oriented x4. Received patient on 3L, currently on 2L and maintaining O2 above 92%. NS on tele. Vss. Lovenox. Up as toña w walker. Voids. Positive for c. Diff. QID accucheck. self prones. PO vanco. Saline locked.  Patient updated on poc, of vasoactive medications to optimize hemodynamic stability  - Monitor arterial and/or venous puncture sites for bleeding and/or hematoma  - Assess quality of pulses, skin color and temperature  - Assess for signs of decreased coronary artery perfusion - e

## 2020-04-15 NOTE — PROGRESS NOTES
[facility] Hospitalist Progress Note     [facility]      SUBJECTIVE:  On 2 L NC, afebrile  Denies SOB  Having gout pain    OBJECTIVE:  Temp:  [97.9 °F (36.6 °C)-99.3 °F (37.4 °C)] 98.7 °F (37.1 °C)  Pulse:  [77-99] 91  Resp:  [15-26] 21  BP: ()/(37-7 Outside portable chest x-ray from [facility] dated 3/14/2020 revealing bibasilar interstitial changes. . TECHNIQUE: PA and left lateral views of the chest, 2 views.  FINDINGS:Cardiac size and pulmonary vasculature are normal. Lungs are fully expanded an densities in the periphery of the left lower lobe.  2. Calcified atherosclerosis of the thoracic aorta and coronary arteries COVID-19 pneumonia imaging classification: Indeterminate Imaging features can be seen with COVID-19 pneumonia though are nonspecific consistent with atelectasis and or scar. No focal consolidation, pleural effusion, or pneumothorax. Atherosclerotic aortic arch. Postsurgical changes with an anchor involves the right humeral head, stable. CONCLUSION:  No focal consolidation.     Dictat Or  Glucose-Vitamin C (DEX-4) chewable tab 8 tablet, 8 tablet, Oral, Q15 Min PRN  Insulin Aspart Pen (NOVOLOG) 100 UNIT/ML flexpen 1-5 Units, 1-5 Units, Subcutaneous, TID CC and HS  Fluticasone Furoate-Vilanterol (BREO ELLIPTA) 200-25 MCG/INH inhaler 1 puf PNA, home O2 walk this afternoon    Questions/concerns were discussed with patient and/or family by bedside.     Discussed with [address] [facility]  Internal Medicine  [facility] Hospitalist

## 2020-04-16 ENCOUNTER — APPOINTMENT (OUTPATIENT)
Dept: ULTRASOUND IMAGING | Facility: HOSPITAL | Age: 65
DRG: 177 | End: 2020-04-16
Attending: HOSPITALIST
Payer: COMMERCIAL

## 2020-04-16 PROCEDURE — C9057 INJ CETIRIZINE HYDROCHLORIDE: HCPCS | Performed by: INTERNAL MEDICINE

## 2020-04-16 PROCEDURE — 82962 GLUCOSE BLOOD TEST: CPT

## 2020-04-16 PROCEDURE — 93971 EXTREMITY STUDY: CPT | Performed by: HOSPITALIST

## 2020-04-16 PROCEDURE — 85025 COMPLETE CBC W/AUTO DIFF WBC: CPT | Performed by: INTERNAL MEDICINE

## 2020-04-16 PROCEDURE — 80053 COMPREHEN METABOLIC PANEL: CPT | Performed by: INTERNAL MEDICINE

## 2020-04-16 RX ORDER — ENOXAPARIN SODIUM 100 MG/ML
100 INJECTION SUBCUTANEOUS EVERY 12 HOURS
Status: DISCONTINUED | OUTPATIENT
Start: 2020-04-17 | End: 2020-04-18

## 2020-04-16 RX ORDER — ENOXAPARIN SODIUM 100 MG/ML
60 INJECTION SUBCUTANEOUS ONCE
Status: COMPLETED | OUTPATIENT
Start: 2020-04-16 | End: 2020-04-16

## 2020-04-16 NOTE — PLAN OF CARE
Problem: Patient/Family Goals  Goal: Patient/Family Long Term Goal  Description  Patient's Long Term Goal: Discharge home    Interventions:  - O2, inhalers, abx, antivirals  - See additional Care Plan goals for specific interventions   Outcome: Progressi Spirometry  - Assess the need for suctioning and perform as needed  - Assess and instruct to report SOB or any respiratory difficulty  - Respiratory Therapy support as indicated  - Manage/alleviate anxiety  - Monitor for signs/symptoms of CO2 retention  Livier Lopez

## 2020-04-16 NOTE — PROGRESS NOTES
[facility] Hospitalist Progress Note     Bianca Robles Patient Status:  Inpatient    1955 MRN [id number]   [facility] 3SW-A Attending Sha Velazquez MD   Hosp Day # 8 PCP Shahid Albarran MD     CC: follow up    SUBJECTIVE:  No CP 0.6 mg Oral BID   • Amitriptyline HCl  10 mg Oral Nightly   • cetirizine  10 mg Oral Nightly   • lisinopril  5 mg Oral Nightly   • PARoxetine HCl  15 mg Oral QAM   • vancomycin HCl  125 mg Oral Q6H   • enoxaparin  40 mg Subcutaneous QPM   • gabapentin  600 trial     # C diff Diarrhea  - Continue PO vancomycin     # Fever, RESOLVED  - secondary to COVID-19 infection as above  - Blood cultures negative     # Hyponatremia - RESOLVED  - mild, likely insensible losses in setting of infection     # Hypotension - R

## 2020-04-16 NOTE — PLAN OF CARE
Pt c/o tightness in left leg. Entire leg swollen. STAT doppler ordered. Critical results revealed DVT in entire leg. MD paged with results.   Awaiting response

## 2020-04-16 NOTE — PROGRESS NOTES
[address] Patient Status:  Inpatient    1955 MRN [id number]   [facility] 3SW-A Attending Chantel Zeng MD   [facility] Day # 10 PCP Mariaelena Gonzalez MD     Pulm / Critical Care Progress Note     S: pt states Normocephalic, without obvious abnormality, atraumatic. Lungs: basilar crackles   Chest wall: No tenderness or deformity. Heart: Regular rate and rhythm, normal S1S2, no murmur. Abdomen: soft, non-tender, non-distended, positive BS.    Extremity: no e peripherally.  Please contact with ?s or change in status.   -she should f/u with Dr. Martha Silva in 1 week via phone visit.      Mirna Fong M.D.  [facility] pulmonary / critical care  4/16/2020  8:02 AM

## 2020-04-16 NOTE — PROGRESS NOTES
INFECTIOUS DISEASE PROGRESS NOTE    [facility] Patient Status:  Inpatient    1955 MRN [id number]   [facility] 3SW-A Attending Eliseo Rosas MD   Hosp Day # 10 PCP Kr Glucose-Vitamin C (DEX-4) chewable tab 4 tablet, 4 tablet, Oral, Q15 Min PRN **OR** dextrose 50 % injection 50 mL, 50 mL, Intravenous, Q15 Min PRN **OR** glucose (DEX4) oral liquid 30 g, 30 g, Oral, Q15 Min PRN **OR** Glucose-Vitamin C (DEX-4) chewable tab CK in the last 168 hours. Inflammatory Markers  Recent Labs   Lab 04/11/20  0455 04/12/20  0538 04/14/20  0517   CRP 9.29* 6.24* 3.63*   NEPTALI 134.4  --  92.8     --   --        Microbiology    Reviewed in EMR,   Hospital Encounter on 04/06/20   1.

## 2020-04-16 NOTE — PLAN OF CARE
Patient is a o x4. Received pt on 1L but currently req 3L to maintain above 92%. Encouraged to self prone. IS. NS on tele. VSS. Lovenox. Voids. Up independently with walker. QID accuchecks. C. Diff+, on po vanco. Saline locked.  Pt c/o pain to L great toe, Monitor vital signs, rhythm, and trends  - Monitor for bleeding, hypotension and signs of decreased cardiac output  - Evaluate effectiveness of vasoactive medications to optimize hemodynamic stability  - Monitor arterial and/or venous puncture sites for bl

## 2020-04-16 NOTE — CM/SW NOTE
KATE followed up with E re:O2 order, signed order sent to them on 4.15.20. Rep confirmed that the order has been received and once they know the dc date they will call the patient and arrange home delivery. KATE advised that patient is ready for dc today,port

## 2020-04-16 NOTE — DISCHARGE SUMMARY
General Medicine Discharge Summary     Patient ID:  Leticia Rojo  59year old  11/17/1955    Admit date: 4/6/2020    Discharge date and time: 4/16/2020    Attending Physician: Mariano Bailon MD repeat 441  - home inhalers, singulair  - avoid nebulizers  - wean O2 as able   - Continue flutter valve     # Gout Flare  - in Left great toe  - ID ok with Nsaids at this point --> ibuprofen prn for pain  - colchicine trial     # C diff Diarrhea  - Contin (cpt=71250)    Result Date: 4/1/2020  DATE OF SERVICE: 04.01.2020 CT CHEST (CPT=71250) CLINICAL INDICATION: Other general symptoms and signs. COMPARISON STUDY: CT 12/27/2019.  TECHNIQUE: Helical images of the chest were obtained from the thoracic inlet thro HISTORY: (As transcribed by Technologist)  Patient offered no additional history at this time. FINDINGS:  Cardiomediastinal silhouette stable with atherosclerotic aortic arch.   Bibasilar increased interstitial opacities without new focal consolidation, CHANGED    fluticasone-salmeterol (ADVAIR HFA) 230-21 MCG/ACT Inhalation Aerosol  Inhale 2 puffs into the lungs 2 (two) times daily. ALPRAZolam 0.25 MG Oral Tab  Take 0.25 mg by mouth nightly as needed.     Amitriptyline HCl 10 MG Oral Tab  Take 10 mg by coordinating care for discharge: approximately 35 minutes spent throughout day of discharge.      Neal Enamorado MD  [facility]

## 2020-04-17 PROCEDURE — C9057 INJ CETIRIZINE HYDROCHLORIDE: HCPCS | Performed by: INTERNAL MEDICINE

## 2020-04-17 PROCEDURE — 85610 PROTHROMBIN TIME: CPT | Performed by: INTERNAL MEDICINE

## 2020-04-17 PROCEDURE — 86146 BETA-2 GLYCOPROTEIN ANTIBODY: CPT | Performed by: INTERNAL MEDICINE

## 2020-04-17 PROCEDURE — 85732 THROMBOPLASTIN TIME PARTIAL: CPT | Performed by: INTERNAL MEDICINE

## 2020-04-17 PROCEDURE — 85705 THROMBOPLASTIN INHIBITION: CPT | Performed by: INTERNAL MEDICINE

## 2020-04-17 PROCEDURE — 80048 BASIC METABOLIC PNL TOTAL CA: CPT | Performed by: HOSPITALIST

## 2020-04-17 PROCEDURE — 86147 CARDIOLIPIN ANTIBODY EA IG: CPT | Performed by: INTERNAL MEDICINE

## 2020-04-17 PROCEDURE — 85025 COMPLETE CBC W/AUTO DIFF WBC: CPT | Performed by: HOSPITALIST

## 2020-04-17 PROCEDURE — 82962 GLUCOSE BLOOD TEST: CPT

## 2020-04-17 PROCEDURE — 85613 RUSSELL VIPER VENOM DILUTED: CPT | Performed by: INTERNAL MEDICINE

## 2020-04-17 PROCEDURE — 85610 PROTHROMBIN TIME: CPT | Performed by: HOSPITALIST

## 2020-04-17 PROCEDURE — 85730 THROMBOPLASTIN TIME PARTIAL: CPT | Performed by: INTERNAL MEDICINE

## 2020-04-17 RX ORDER — VANCOMYCIN HYDROCHLORIDE 125 MG/1
125 CAPSULE ORAL EVERY 6 HOURS
Status: DISCONTINUED | OUTPATIENT
Start: 2020-04-17 | End: 2020-04-18

## 2020-04-17 RX ORDER — POTASSIUM CHLORIDE 20 MEQ/1
40 TABLET, EXTENDED RELEASE ORAL ONCE
Status: COMPLETED | OUTPATIENT
Start: 2020-04-17 | End: 2020-04-17

## 2020-04-17 NOTE — CM/SW NOTE
Care Progression Note:  Active Acute Medical Issue: COVID-19, new DVT - hem/onc consulted  Other Contributing Medical Factors/Dx. : gout, cdiff, DM, HTN  Length of stay: 11  Avoidable Delays: none  Discharge Barriers: none  Expected discharge date: When cli

## 2020-04-17 NOTE — PLAN OF CARE
Problem: DISCHARGE PLANNING  Goal: Discharge to home or other facility with appropriate resources  Description  INTERVENTIONS:  - Identify barriers to discharge w/pt and caregiver  - Include patient/family/discharge partner in discharge Yvan Alvarez Administer supportive blood products/factors as ordered and appropriate  Outcome: Progressing    Pt is admitted for PNA . Covid positive. Droplet/Contact and Contact plus isolation precaution in place.   Pt is AOx4, VSS. Afebrile. Denies any pain.    SpO2 m

## 2020-04-17 NOTE — DIETARY NOTE
[facility]    NUTRITION ASSESSMENT    Pt does not meet malnutrition criteria. NUTRITION DIAGNOSIS/PROBLEM:    Inadequate oral intake related to decreased ability to consume enough PO as evidenced by +MST    NUTRITION INTERVENTION:       1.  Meal and Preferences Addresses: Yes    NUTRITION RELATED PHYSICAL FINDINGS:     1. Body Fat/Muscle Mass: BMI 36.20- obese     2.  Fluid Accumulation: None per RN    NUTRITION PRESCRIPTION:IBW  Calories: 1350-1620calories/day (25-30calories per kg)  Protein: 81-108gr

## 2020-04-17 NOTE — PROGRESS NOTES
INFECTIOUS DISEASE PROGRESS NOTE    Win Valdez Gary Patient Status:  Inpatient    1955 MRN [id number]   [facility] 3SW-A Attending Batsheva Prater MD   Hosp Day # 11 PCP Kr **OR** Glucose-Vitamin C (DEX-4) chewable tab 4 tablet, 4 tablet, Oral, Q15 Min PRN **OR** dextrose 50 % injection 50 mL, 50 mL, Intravenous, Q15 Min PRN **OR** glucose (DEX4) oral liquid 30 g, 30 g, Oral, Q15 Min PRN **OR** Glucose-Vitamin C (DEX-4) chewa input(s): TROP, PBNP in the last 168 hours. Creatinine Kinase  No results for input(s): CK in the last 168 hours.     Inflammatory Markers  Recent Labs   Lab 04/11/20  0455 04/12/20  0538 04/14/20  0517   CRP 9.29* 6.24* 3.63*   NEPTALI 134.4  --  92.8   LDH

## 2020-04-17 NOTE — PROGRESS NOTES
[facility] Hospitalist Progress Note     Montrell Hay Patient Status:  Inpatient    1955 MRN [id number]   [facility] 3SW-A Attending Vincent Chambers MD   [facility] Day # 6 PCP Josr Miranda MD     CC: follow up    24h events/SUBJECT --        Recent Labs   Lab 04/16/20  0823 04/16/20  1219 04/16/20  1727 04/16/20  2103 04/17/20  0736   PGLU 101* 96 97 121* 109*       Meds:   Scheduled Medication:  • enoxaparin  100 mg Subcutaneous Q12H   • colchicine  0.6 mg Oral BID   • Amitriptyli flutter valve     # Gout Flare  - in Left great toe  - ID ok with Nsaids at this point --> will try low dose ibuprofen prn for pain  - colchicine trial     # C diff Diarrhea  - Continue PO vancomycin     # Fever, RESOLVED  - secondary to COVID-19 infection

## 2020-04-17 NOTE — CONSULTS
[facility]  Report of Consultation    Media Awmichael Rojo Patient Status:  Inpatient    1955 MRN [id number]   [facility] 3SW-A Attending Bri Torres MD   [facility] Day # 6 PCP Yayo Myles MD     Reason for Consultation:  DV sepsis---Resolved   • Osteoarthrosis, unspecified whether generalized or localized, unspecified site    • PONV (postoperative nausea and vomiting)    • Pulmonary emphysema (HCC)      Past Surgical History:   Procedure Laterality Date   • ARTHROSCOPY RIGHT LIGATION TIPP Right 11/17/2014    Performed by Sravan Tse MD at [facility] MAIN OR     Family History   Problem Relation Age of Onset   • Breast Cancer Paternal Grandmother 72        dx 63's   • Cancer Paternal Grandmother         Breast cancer   • Stroke (NEURONTIN) cap 600 mg, 600 mg, Oral, QAM  •  gabapentin (NEURONTIN) cap 900 mg, 900 mg, Oral, Nightly  •  Montelukast Sodium (SINGULAIR) tab 10 mg, 10 mg, Oral, Nightly  •  Oxybutynin Chloride (DITROPAN) tab 5 mg, 5 mg, Oral, BID  •  Dicyclomine HCl (BENT sounds. Extremities: LLE--one plus edema. No erythema, no cording. RLE--TKR scar. Neurological: Grossly intact. Laboratory Data:    Lab Results   Component Value Date    WBC 7.3 04/17/2020    HGB 10.4 04/17/2020    HCT 33.9 04/17/2020    . 0 Right shoulder rotator cuff repair  global 12/4/2019     COVID-19    Impression  1. Provoked DVT during admission for COVID19. Was on Lovenox prophylaxis per RNs  2. Satisfactory tolerance to Lovenox full dosing currently  3.  Improved LLE symptoms on L

## 2020-04-18 VITALS
RESPIRATION RATE: 20 BRPM | OXYGEN SATURATION: 96 % | WEIGHT: 209.13 LBS | HEIGHT: 64 IN | TEMPERATURE: 98 F | BODY MASS INDEX: 35.7 KG/M2 | DIASTOLIC BLOOD PRESSURE: 65 MMHG | SYSTOLIC BLOOD PRESSURE: 148 MMHG | HEART RATE: 97 BPM

## 2020-04-18 PROCEDURE — 84132 ASSAY OF SERUM POTASSIUM: CPT | Performed by: HOSPITALIST

## 2020-04-18 RX ORDER — POTASSIUM CHLORIDE 20 MEQ/1
40 TABLET, EXTENDED RELEASE ORAL ONCE
Status: COMPLETED | OUTPATIENT
Start: 2020-04-18 | End: 2020-04-18

## 2020-04-18 NOTE — PLAN OF CARE
Patient is alert and oriented x4. Requiring 2L to maintain O2 above 90%. IS and flutter valve. VSS. NS on tele. BID lovenox for left leg dvt. Pt declines teds. Up as toña. Voids. C/o back pain with relief from motrin. Saline locked.  Pt updated on poc, all q services based on physician/LIP order or complex needs related to functional status, cognitive ability or social support system  Outcome: Progressing     Problem: RESPIRATORY - ADULT  Goal: Achieves optimal ventilation and oxygenation  Description  INTERVE

## 2020-04-18 NOTE — PROGRESS NOTES
NURSING DISCHARGE NOTE    Discharged Home via Wheelchair. Accompanied by Support staff  Belongings Taken by patient/family. Pt discharged home. Discharge instructions reviewed with patient.  Prescriptions picked up by patient's  prior to disc

## 2020-04-18 NOTE — PROGRESS NOTES
[facility]  Progress Note    Katie Marcos Patient Status:  Inpatient    1955 MRN [id number]   [facility] 3SW-A Attending Concha Crabtree, *   Hosp Day # 12 PCP Edin Shultz MD     Subjective:  Feeling well.  Meddle

## 2020-04-18 NOTE — DISCHARGE SUMMARY
General Medicine Discharge Summary     Patient ID:  Dwan Krabbe  59year old  [id number]  11/17/1955    Admit date: 4/6/2020    Discharge date and time: 4/18/20    Attending Physician: Marissa Win, *     Primary Care Physician: Emery Patterson normotensive  - hold home anti-HTNs     # DMII  - hold PO meds  - accuchecks, ISS     # Psych   - resume paroxetine, amitriptline resumed at bedtime  - QTC improving 457  - PRN xanax     # Acute LLE DVT, extensive  - apprec hematology recommendations - the were followed while maintaining the necessary diagnostic image quality. ADVERSE REACTION: None. FINDINGS:  HEART/AORTA: Calcified atherosclerosis of the thoracic aorta and coronary arteries. No significant pericardial effusion.  MEDIASTINUM/ANDERS: No medias throughout extending from the visualized external iliac vein through the mid posterior tibial vein. The thrombus near occlusive in the common femoral vein and occlusive elsewhere. Thrombus also extends through the saphenofemoral junction.   The distal pos silhouette is unchanged. Pulmonary vasculature is stable. No pneumothorax. Minimal reticular opacities at the right lung base are stable. No pneumothorax. CONCLUSION:  Increased left lower lobe consolidation.     Dictated by: Callie Ratliff MD on 4 (two) times daily. PROAIR  (90 Base) MCG/ACT Inhalation Aero Soln  INHALE TWO PUFFS BY MOUTH THREE TIMES DAILY    folic acid 320 MCG Oral Tab  Take 400 mcg by mouth daily.     Cholecalciferol (VITAMIN D) 50 MCG (2000 UT) Oral Tab  Take 50 mcg by m the office for approximately 2 hours.   [address]  [place] [address]   [phone]    [id number] Follow Up with Lamar Rutledge MD   Thursday Jun 25, 2020 10:40 AM  [facility] PULMONARY MEDICINE   [address] [facility]  [place]

## 2020-04-18 NOTE — PROGRESS NOTES
[facility] Hospitalist Progress Note     Ximena Butcher Patient Status:  Inpatient    1955 MRN [id number]   [facility] 3SW-A Attending Laura Valdes MD   [address] Day # 15 PCP Jeni Jaimes MD     CC: follow up    24h events/SUBJECT Recent Labs   Lab 04/16/20  0823 04/16/20  1219 04/16/20  1727 04/16/20  2103 04/17/20  0736   PGLU 101* 96 97 121* 109*       Meds:   Scheduled Medication:  • vancomycin HCl  125 mg Oral Q6H   • enoxaparin  100 mg Subcutaneous Q12H   • colchicine  0 nebulizers  - wean O2 as able   - continue flutter valve     # Gout Flare-improved  - in Left great toe  - ID ok with Nsaids at this point -->  try low dose ibuprofen prn for pain  - colchicine trial     # C diff Diarrhea  - Continue PO vancomycin     # Fe

## 2020-04-23 PROBLEM — I82.409 DVT (DEEP VENOUS THROMBOSIS) (HCC): Status: ACTIVE | Noted: 2020-04-23

## 2020-04-27 ENCOUNTER — HOSPITAL ENCOUNTER (INPATIENT)
Facility: HOSPITAL | Age: 65
LOS: 7 days | Discharge: HOME | DRG: 177 | End: 2020-05-04
Attending: EMERGENCY MEDICINE | Admitting: INTERNAL MEDICINE
Payer: COMMERCIAL

## 2020-04-27 DIAGNOSIS — D64.9 ANEMIA, UNSPECIFIED TYPE: ICD-10-CM

## 2020-04-27 DIAGNOSIS — Z79.01 ANTICOAGULATED: ICD-10-CM

## 2020-04-27 DIAGNOSIS — K92.2 GASTROINTESTINAL HEMORRHAGE, UNSPECIFIED GASTROINTESTINAL HEMORRHAGE TYPE: Primary | ICD-10-CM

## 2020-04-27 PROCEDURE — 99285 EMERGENCY DEPT VISIT HI MDM: CPT

## 2020-04-27 PROCEDURE — 85018 HEMOGLOBIN: CPT | Performed by: INTERNAL MEDICINE

## 2020-04-27 PROCEDURE — C9057 INJ CETIRIZINE HYDROCHLORIDE: HCPCS | Performed by: INTERNAL MEDICINE

## 2020-04-27 PROCEDURE — 93005 ELECTROCARDIOGRAM TRACING: CPT

## 2020-04-27 PROCEDURE — 82962 GLUCOSE BLOOD TEST: CPT

## 2020-04-27 PROCEDURE — 93010 ELECTROCARDIOGRAM REPORT: CPT

## 2020-04-27 RX ORDER — MONTELUKAST SODIUM 10 MG/1
10 TABLET ORAL NIGHTLY
Status: DISCONTINUED | OUTPATIENT
Start: 2020-04-27 | End: 2020-05-04

## 2020-04-27 RX ORDER — PAROXETINE 10 MG/1
15 TABLET, FILM COATED ORAL EVERY MORNING
Status: DISCONTINUED | OUTPATIENT
Start: 2020-04-28 | End: 2020-05-04

## 2020-04-27 RX ORDER — ALBUTEROL SULFATE 90 UG/1
1 AEROSOL, METERED RESPIRATORY (INHALATION) 3 TIMES DAILY
Status: DISCONTINUED | OUTPATIENT
Start: 2020-04-28 | End: 2020-05-04

## 2020-04-27 RX ORDER — ACETAMINOPHEN 325 MG/1
650 TABLET ORAL EVERY 6 HOURS PRN
Status: DISCONTINUED | OUTPATIENT
Start: 2020-04-27 | End: 2020-05-04

## 2020-04-27 RX ORDER — GABAPENTIN 300 MG/1
900 CAPSULE ORAL NIGHTLY
Status: DISCONTINUED | OUTPATIENT
Start: 2020-04-27 | End: 2020-05-04

## 2020-04-27 RX ORDER — DEXTROSE MONOHYDRATE 25 G/50ML
50 INJECTION, SOLUTION INTRAVENOUS
Status: DISCONTINUED | OUTPATIENT
Start: 2020-04-27 | End: 2020-05-04

## 2020-04-27 RX ORDER — AMITRIPTYLINE HYDROCHLORIDE 10 MG/1
10 TABLET, FILM COATED ORAL NIGHTLY
Status: DISCONTINUED | OUTPATIENT
Start: 2020-04-27 | End: 2020-05-04

## 2020-04-27 RX ORDER — ALPRAZOLAM 0.25 MG/1
0.25 TABLET ORAL NIGHTLY PRN
Status: DISCONTINUED | OUTPATIENT
Start: 2020-04-27 | End: 2020-05-04

## 2020-04-27 RX ORDER — GABAPENTIN 300 MG/1
600 CAPSULE ORAL EVERY MORNING
Status: DISCONTINUED | OUTPATIENT
Start: 2020-04-28 | End: 2020-05-04

## 2020-04-27 RX ORDER — CETIRIZINE HYDROCHLORIDE 10 MG/1
10 TABLET ORAL NIGHTLY
Status: DISCONTINUED | OUTPATIENT
Start: 2020-04-27 | End: 2020-05-04

## 2020-04-27 RX ORDER — DICYCLOMINE HYDROCHLORIDE 10 MG/1
10 CAPSULE ORAL 2 TIMES DAILY PRN
Status: DISCONTINUED | OUTPATIENT
Start: 2020-04-27 | End: 2020-05-04

## 2020-04-27 RX ORDER — OXYBUTYNIN CHLORIDE 5 MG/1
5 TABLET ORAL 2 TIMES DAILY
Status: DISCONTINUED | OUTPATIENT
Start: 2020-04-27 | End: 2020-05-04

## 2020-04-27 RX ORDER — MELATONIN
400 DAILY
Status: DISCONTINUED | OUTPATIENT
Start: 2020-04-28 | End: 2020-05-04

## 2020-04-27 RX ORDER — SODIUM CHLORIDE 9 MG/ML
INJECTION, SOLUTION INTRAVENOUS CONTINUOUS
Status: DISCONTINUED | OUTPATIENT
Start: 2020-04-27 | End: 2020-04-30

## 2020-04-27 NOTE — ED INITIAL ASSESSMENT (HPI)
PT TO ED FROM HOME WITH C/O INCREASED SOB. RECENTLY DISCHARGED ON THE [address]  PT WENT TO IMMEDIATE CARE TODAY AND HAD LABS DRAWN, HGB 8

## 2020-04-27 NOTE — ED PROVIDER NOTES
Patient Seen in: [facility] Emergency Department      History   Patient presents with:  Anemia    Stated Complaint: anemia and bleeding from somewhere.      HPI    CHIEF COMPLAINT: Sent here for falling hemoglobin level     HISTORY OF PRESENT ILLNESS when she was diagnosed with COVID-19. She states that she has longstanding history of COPD and has been doing her COPD treatments at home as previously prescribed. No nausea, vomiting or diarrhea. No hematuria.      REVIEW OF SYSTEMS:  Constitutional: no REPLACEMENT SURGERY Right    • KNEE TOTAL REPLACEMENT Right 9/8/2016    Performed by Will Kahn MD at [facility] MAIN OR   • LEFT PHACOEMULSIFICATION OF CATARACT WITH INTRAOCULAR LENS IMPLANT 77457 Left 1/30/2017    Performed by Nick Nevarez MD at [facility] [place] bleeding from somewhere. Other systems are as noted in HPI. Constitutional and vital signs reviewed. All other systems reviewed and negative except as noted above.     Physical Exam     ED Triage Vitals [04/27/20 1705]   /84   Pulse 83   Resp EKG from 4/2020, no significant changes noted.                     Ct Angiography, Chest (cpt=71275)    Result Date: 4/27/2020  DATE OF SERVICE: 04.27.2020 [facility] PULMONARY ARTERIES: CLINICAL INDICATION: Shortness of breath COMPARISON: Chest CT 4/1/2020 [facility] Date: 4/16/2020  PROCEDURE:  US VENOUS DOPPLER LEG LEFT - DIAG IMG (CPT=93971)  COMPARISON:  None. INDICATIONS:  LLE swelling and pain  TECHNIQUE:  Real time, grey scale, and duplex ultrasound was used to evaluate the lower extremity venous system.  B-mode This patient was seen and examined Dr. Manoj Romero, who agrees with the disposition and plan.   Admission disposition: 4/27/2020  6:17 PM                   Disposition and Plan     Clinical Impression:  Gastrointestinal hemorrhage, unspecified gastrointestinal h

## 2020-04-27 NOTE — H&P
[facility] Hospitalist H&P       CC: Patient presents with:  Anemia       PCP: Pura Acosta MD    History of Present Illness:  Pt is a 61y/o F with with hx COPD, DMII, IBS. She was recently admitted due to resp failure 2/2 COVID-19 infection.  Admission was Repeat 2028   • HYSTERECTOMY  1985   • KNEE ARTHROSCOPY Right 5/19/2016    Performed by Chris Knight MD at [facility]   • KNEE REPLACEMENT SURGERY Right    • KNEE TOTAL REPLACEMENT Right 9/8/2016    Performed by Chris Knight MD at [facility] MAIN OR   • Hx  Social History    Tobacco Use      Smoking status: Former Smoker        Packs/day: 1.50        Years: 34.00        Pack years: 46        Start date: 1/3/1973        Quit date: 2007        Years since quittin.6      Smokeless tobacco: Never Use the last 168 hours.     Additional Diagnostics:     ECG: personally reviewed  Sinus rhythm with Premature atrial complexes  Otherwise normal ECG  When compared with ECG of 08-APR-2020 09:09,  Premature atrial complexes are now Present    Radiology:   Katt Aguero mediastinal or hilar lymphadenopathy. LUNGS AND AIRWAYS: Patent  trachea and central bronchi. PET/CT ill-defined groundglass densities at the periphery of the left lower lobe. No confluent consolidation. PLEURA: No significant pleural effusion.  CHEST WALL, lymph nodes. Heart and great vessels:  Unremarkable. Mild calcific plaque is seen in the thoracic aorta. Chest wall: Unremarkable. Upper abdomen:  Grossly unremarkable. Bones:  Endplate spurring is seen in the spine. IMPRESSION: 1.  Negative exam for pul of the left leg extending into the visualized external iliac vein. Critical value result is telephoned to patient's nurse Yasemin at 1631 hours. Read back is performed.     Dictated by: Gurvinder Mckeon MD on 4/16/2020 at 4:29 PM     Finalized by: Gurvinder Mckeon 61y/o F with with hx COPD, DMII, IBS. She was recently admitted due to resp failure 2/2 COVID-19 infection. Admission was c/b C diff infection (recently completed course of vanco) and extensive LLE DVT for which she was started on xarelto.  She was seen

## 2020-04-28 PROCEDURE — 82962 GLUCOSE BLOOD TEST: CPT

## 2020-04-28 PROCEDURE — 85027 COMPLETE CBC AUTOMATED: CPT | Performed by: INTERNAL MEDICINE

## 2020-04-28 PROCEDURE — 87088 URINE BACTERIA CULTURE: CPT | Performed by: INTERNAL MEDICINE

## 2020-04-28 PROCEDURE — 87493 C DIFF AMPLIFIED PROBE: CPT | Performed by: PHYSICIAN ASSISTANT

## 2020-04-28 PROCEDURE — 87186 SC STD MICRODIL/AGAR DIL: CPT | Performed by: INTERNAL MEDICINE

## 2020-04-28 PROCEDURE — C9057 INJ CETIRIZINE HYDROCHLORIDE: HCPCS | Performed by: INTERNAL MEDICINE

## 2020-04-28 PROCEDURE — 80048 BASIC METABOLIC PNL TOTAL CA: CPT | Performed by: INTERNAL MEDICINE

## 2020-04-28 PROCEDURE — 87086 URINE CULTURE/COLONY COUNT: CPT | Performed by: INTERNAL MEDICINE

## 2020-04-28 PROCEDURE — 85018 HEMOGLOBIN: CPT | Performed by: INTERNAL MEDICINE

## 2020-04-28 PROCEDURE — 81001 URINALYSIS AUTO W/SCOPE: CPT | Performed by: INTERNAL MEDICINE

## 2020-04-28 PROCEDURE — 85610 PROTHROMBIN TIME: CPT | Performed by: INTERNAL MEDICINE

## 2020-04-28 RX ORDER — VANCOMYCIN HYDROCHLORIDE 125 MG/1
125 CAPSULE ORAL DAILY
Status: DISCONTINUED | OUTPATIENT
Start: 2020-04-28 | End: 2020-05-04

## 2020-04-28 RX ORDER — LEVOFLOXACIN 750 MG/1
750 TABLET ORAL DAILY
Status: DISCONTINUED | OUTPATIENT
Start: 2020-04-28 | End: 2020-05-04

## 2020-04-28 NOTE — PROGRESS NOTES
[facility] Hospitalist Progress Note     Rachele Nicole Patient Status:  Inpatient    1955 MRN [id number]   [facility] 3SW-A Attending Lawrence Mckeon MD   [facility] Day # 1 PCP Gaston Benitez MD     CC: follow up    SUBJECTIVE:  B as influenza pneumonia and organizing pneumonia, as can be seen with drug toxicity and connective tissue disease, can cause a similar imaging pattern. Expert Consensus Statement on Reporting Chest CT Findings Related to COVID-19.  Endorsed by the Society of recovered from COVID-19 infection, on 2L NC baseline which is stable  - CT angio negative for PE - findings c/w recent COVID-19  - EKG w/o ischemic change     # LLE DVT  # Lupus anticoagulant  - hold AC d/t above  - follows with heme - will consult      #

## 2020-04-28 NOTE — PLAN OF CARE
Problem: Possible GI bleed, Anemia  Data: Ax04. On telemetry , NSR. , 1L of oxygen. 02 saturation WNL. Afebrile. Denies pain. Verbalized she has been having diarrhea with small amounts of blood. Poor appetite.  Diabetic, accu checks and carb controlled d anxiety  - Monitor for signs/symptoms of CO2 retention  Outcome: Progressing     Problem: GASTROINTESTINAL - ADULT  Goal: Minimal or absence of nausea and vomiting  Description  INTERVENTIONS:  - Maintain adequate hydration with IV or PO as ordered and toña use of toothpicks and dental floss  - Use electric shaver for shaving  - Use soft bristle tooth brush  - Limit straining and forceful nose blowing  Outcome: Progressing

## 2020-04-28 NOTE — BH RN ADMISSION NOTE
NURSING ADMISSION NOTE      Patient admitted via [address] to room 385  Safety precautions initiated. Bed in low position. Call light in reach. Patient admitted to room 385. Patient Ax04. Placed on telemetry and continuous pulse oximetry.  On

## 2020-04-28 NOTE — CONSULTS
[facility] IP Report of Consultation   [address] [facility] Department of  Gastroenterology    4/28/2020    [facility]  female   Andrey [place], [place]     [id number]  11/17/1955 Primary Care Physician  Reuben Alarcon MD     Reason for [facility] unspecified site    • Peripheral vascular disease ([facility] [address]    • PONV (postoperative nausea and vomiting)     emesis    • Pulmonary emphysema (HCC)        Past Surgical History:    Past Surgical History:   Procedure Laterality Date   • ARTHROSCOPY RIGHT KNEE W LIGATION TIPP Right 11/17/2014    Performed by Megan Be MD at [facility] MAIN OR     Allergies:   Dander                  UNKNOWN    Comment:CAT  Ertapenem               RASH, ITCHING  Grass                   UNKNOWN  Statins                     Comment: alcohol use of about 5.8 standard drinks of alcohol per week. She reports that she does not use drugs.     Family History:    Family History   Problem Relation Age of Onset   • Breast Cancer Paternal Grandmother 72        dx 63's   • Cancer Paternal Grandmo for Thursday colonoscopy), may require IV heparin if inr < 2 and would consult hematology regarding this    Andrey Mckeon  4/28/2020  10:09 AM

## 2020-04-28 NOTE — PROGRESS NOTES
Pt is a 58 y/o female admitted with acute resp distress due to covid and acute rectal bleed. pt was on xarelto and coumadin for DVT. alert oriented. on 1L02 via NC.02 sats >90%. inflamatory markers are stable. encouraged IS and proning. noticed mild bleeding tod

## 2020-04-28 NOTE — ED NOTES
Telephone report given to WENDY Daniel. Report included ED summary, vitals, results. Pt will be transported to inpatient bed 385.

## 2020-04-28 NOTE — PLAN OF CARE
Problem: Diabetes/Glucose Control  Goal: Glucose maintained within prescribed range  Description  INTERVENTIONS:  - Monitor Blood Glucose as ordered  - Assess for signs and symptoms of hyperglycemia and hypoglycemia  - Administer ordered medications to m as ordered and tolerated  - Nasogastric tube to low intermittent suction as ordered  - Evaluate effectiveness of ordered antiemetic medications  - Provide nonpharmacologic comfort measures as appropriate  - Advance diet as tolerated, if ordered  - Obtain n signs and symptoms of internal bleeding  - Monitor lab trends  - Patient is to report abnormal signs of bleeding to staff  - Avoid use of toothpicks and dental floss  - Use electric shaver for shaving  - Use soft bristle tooth brush  - Limit straining and

## 2020-04-28 NOTE — DIETARY NOTE
[facility]    NUTRITION ASSESSMENT    Pt does not meet malnutrition criteria. NUTRITION DIAGNOSIS/PROBLEM:    Inadequate oral intake related to decreased ability to consume enough PO as evidenced by +MST    NUTRITION INTERVENTION:       1.  Meal and 100-108    Pt is at moderate nutrition risk    FOLLOW-UP DATE: 5/1    Bernard Friday, LORI, LDN  Pager [phone]

## 2020-04-29 PROBLEM — G47.10 HYPERSOMNIA: Status: ACTIVE | Noted: 2020-04-29

## 2020-04-29 PROCEDURE — 85610 PROTHROMBIN TIME: CPT | Performed by: INTERNAL MEDICINE

## 2020-04-29 PROCEDURE — C9057 INJ CETIRIZINE HYDROCHLORIDE: HCPCS | Performed by: INTERNAL MEDICINE

## 2020-04-29 PROCEDURE — 85018 HEMOGLOBIN: CPT | Performed by: INTERNAL MEDICINE

## 2020-04-29 PROCEDURE — 82962 GLUCOSE BLOOD TEST: CPT

## 2020-04-29 NOTE — PROGRESS NOTES
[address] [address]/66/9508  [id number]   Provider:    Yanely Melton MD         Subjective: Spoke to patient over phone to reduce PPE usage. Hgb stable. No brbpr.   Allergies:    Dander                  UNKNOWN    Comment:CAT  Ertap Glucose-Vitamin C (DEX-4) chewable tab 8 tablet, 8 tablet, Oral, Q15 Min PRN  •  Albuterol Sulfate  (90 Base) MCG/ACT inhaler 1 puff, 1 puff, Inhalation, TID       HISTORY:  Past Medical History:   Diagnosis Date   • Anxiety state    • Asthma    • [facility] [place] FOR PAIN MANAGEMENT   • LUMBAR FACET INJECTION OR MEDIAL BRANCH NERVE BLOCK Right 7/9/2018    Performed by Kris Perez MD at . [facility] 139 Right 8/21/2018    Performed by Kris Perez MD at [address] [place] [address] performed. Spoke to patient over phone to reduce PPE usage. LAB/IMAGING RESULTS:     Lab Results   Component Value Date    HGB 7.7 04/29/2020    INR 2.23 04/29/2020    PTP 25.3 04/29/2020    PGLU 93 04/29/2020         ASSESSMENT AND PLAN:   1.  BRBPR

## 2020-04-29 NOTE — CONSULTS
[facility]  Report of Consultation    Fortino Rojo Patient Status:  Inpatient    1955 MRN [id number]   [facility] 3SW-A Attending Mirna Casanova MD   [facility] Day # 2 PCP Dmitri Alexander MD     REASON FOR CONSULTATION: syndrome)    • Incontinence of urine    • Kidney failure 12/2015    with sepsis---Resolved   • Osteoarthrosis, unspecified whether generalized or localized, unspecified site    • Peripheral vascular disease ([facility] [address]    • PONV (postoperative nausea and vomitin MD JESSICA at [facility] MAIN OR   • STAB PHLEBECTOMY, VARICOSE VEINS, 1 EXTREMITY; <20 INCISIONS  201   • TONSILLECTOMY  1963   • VEIN LIGATION TIPP Right 11/17/2014    Performed by Jose Luis Saleh MD at [facility] MAIN OR     Family History   Problem Relation Age of Onset PARoxetine HCl (PAXIL) tab 15 mg, 15 mg, Oral, QAM  •  Umeclidinium Bromide (INCRUSE ELLIPTA) 62.5 MCG/INH inhaler 1 puff, 1 puff, Inhalation, Daily  •  Oxybutynin Chloride (DITROPAN) tab 5 mg, 5 mg, Oral, BID  •  glucose (DEX4) oral liquid 15 g, 15 g, Ora Moderate (A) Negative    pH Urine 7.0 4.5 - 8.0    Protein Urine 100  (A) Negative mg/dl    Urobilinogen Urine <2.0 0.2 - 2.0 mg/dL    Nitrite Urine Positive (A) Negative    Leukocyte Esterase Urine Large (A) Negative    WBC Urine >50 (A) <5 /HPF    RBC UR Thoracic Radiology, the [facility] [employer] of Radiology, and Radiological Society of [address] LIST:     Patient Active Problem List:     Essential hypertension     Type 2 diabetes mellitus without complication, without long-term current u Wolfgang Hanna MD

## 2020-04-29 NOTE — PLAN OF CARE
Assumed care @ 1900. Patient alert oriented x4. On telemetry monitoring. Denies SOB, Denies any pain. Updated patient with plan of care, verbalizes understanding. Ensures patient safety  Maintained a calm and safe environment. Side rails up x2.     Nicole Sr as ordered and tolerated  - Evaluate effectiveness of GI medications  - Encourage mobilization and activity  - Obtain nutritional consult as needed  - Establish a toileting routine/schedule  - Consider collaborating with pharmacy to review patient's medica

## 2020-04-29 NOTE — PLAN OF CARE
Pt alert and oriented x4. VSS on 1L O2. Afebrile. Tele-NSR. Voiding freely. Last BM 4/27, passing flatus. Tolerating clear liq diet, no N/V. IVF. Monitoring hmg and INR. Colonoscopy 4/30. Golytely to start this evening. Denies pain.  Isolation precautions m

## 2020-04-29 NOTE — PROGRESS NOTES
[facility] Hospitalist Progress Note     Marya Demarco Patient Status:  Inpatient    1955 MRN [id number]   [facility] 3SW-A Attending Christal Kenny MD   [address] Day # 2 PCP Reuben Alarcon MD     CC: follow up    SUBJECTIVE:  R Oral Nightly   • cetirizine  10 mg Oral Nightly   • Fluticasone Furoate-Vilanterol  1 puff Inhalation Daily   • folic acid  599 mcg Oral Daily   • gabapentin  600 mg Oral QAM   • gabapentin  900 mg Oral Nightly   • Montelukast Sodium  10 mg Oral Nightly Recent C diff  - completed course of PO vanco  - start ppx vanco d/t UTI     # DMII  - SQ SSI, accuchecks  - hold metformin     # IBS  - home meds     # COPD  - not in exacerbation  - home inhalers     Diet: CLD  DVT ppx: SCDs  Dispo: david  Code: FULL

## 2020-04-30 PROCEDURE — 85027 COMPLETE CBC AUTOMATED: CPT | Performed by: INTERNAL MEDICINE

## 2020-04-30 PROCEDURE — 86900 BLOOD TYPING SEROLOGIC ABO: CPT | Performed by: INTERNAL MEDICINE

## 2020-04-30 PROCEDURE — 85018 HEMOGLOBIN: CPT | Performed by: INTERNAL MEDICINE

## 2020-04-30 PROCEDURE — 82962 GLUCOSE BLOOD TEST: CPT

## 2020-04-30 PROCEDURE — 87999 UNLISTED MICROBIOLOGY PX: CPT

## 2020-04-30 PROCEDURE — 0DJ08ZZ INSPECTION OF UPPER INTESTINAL TRACT, VIA NATURAL OR ARTIFICIAL OPENING ENDOSCOPIC: ICD-10-PCS | Performed by: INTERNAL MEDICINE

## 2020-04-30 PROCEDURE — 30233N1 TRANSFUSION OF NONAUTOLOGOUS RED BLOOD CELLS INTO PERIPHERAL VEIN, PERCUTANEOUS APPROACH: ICD-10-PCS | Performed by: INTERNAL MEDICINE

## 2020-04-30 PROCEDURE — 85610 PROTHROMBIN TIME: CPT | Performed by: INTERNAL MEDICINE

## 2020-04-30 PROCEDURE — 86901 BLOOD TYPING SEROLOGIC RH(D): CPT | Performed by: INTERNAL MEDICINE

## 2020-04-30 PROCEDURE — 86920 COMPATIBILITY TEST SPIN: CPT

## 2020-04-30 PROCEDURE — C9057 INJ CETIRIZINE HYDROCHLORIDE: HCPCS | Performed by: INTERNAL MEDICINE

## 2020-04-30 PROCEDURE — 0DJD8ZZ INSPECTION OF LOWER INTESTINAL TRACT, VIA NATURAL OR ARTIFICIAL OPENING ENDOSCOPIC: ICD-10-PCS | Performed by: INTERNAL MEDICINE

## 2020-04-30 PROCEDURE — 86850 RBC ANTIBODY SCREEN: CPT | Performed by: INTERNAL MEDICINE

## 2020-04-30 RX ORDER — WARFARIN SODIUM 1 MG/1
3 TABLET ORAL
Status: COMPLETED | OUTPATIENT
Start: 2020-04-30 | End: 2020-04-30

## 2020-04-30 RX ORDER — ENOXAPARIN SODIUM 100 MG/ML
1 INJECTION SUBCUTANEOUS EVERY 12 HOURS SCHEDULED
Status: DISCONTINUED | OUTPATIENT
Start: 2020-04-30 | End: 2020-05-04

## 2020-04-30 RX ORDER — HYDROCORTISONE 25 MG/G
CREAM TOPICAL 2 TIMES DAILY
Status: DISCONTINUED | OUTPATIENT
Start: 2020-04-30 | End: 2020-05-04

## 2020-04-30 RX ORDER — HYDROCORTISONE 25 MG/G
CREAM TOPICAL 2 TIMES DAILY
Status: DISCONTINUED | OUTPATIENT
Start: 2020-04-30 | End: 2020-04-30

## 2020-04-30 RX ORDER — SODIUM CHLORIDE 9 MG/ML
INJECTION, SOLUTION INTRAVENOUS ONCE
Status: COMPLETED | OUTPATIENT
Start: 2020-04-30 | End: 2020-04-30

## 2020-04-30 NOTE — PROGRESS NOTES
04/30/20 0815   Provider Notification   Reason for Communication Other (comment)  (Hgb 7.1 - confirming that pt is ok to proceed w/ colonoscopy)   Provider Name Other (comment)  (Dr. Jennie Traylor)   Method of Communication Call   Response Phone call; Othe

## 2020-04-30 NOTE — DIETARY NOTE
[facility]    NUTRITION ASSESSMENT    Pt does not meet malnutrition criteria. NUTRITION DIAGNOSIS/PROBLEM:    Inadequate oral intake related to decreased ability to consume enough PO as evidenced by +MST    NUTRITION INTERVENTION:       1.  Meal and lean body mass    MEDICATIONS:  noted    LABS:  POC 92-94    Pt is at moderate nutrition risk    FOLLOW-UP DATE: 5/5    Hung Ram RD, LDN  Pager [phone]

## 2020-04-30 NOTE — H&P
[address] Patient Status:  Inpatient    1955 MRN [id number]   [facility] 3SW-A Attending Humberto Wall MD   [address] Day # 3 PCP Shahid Albarran MD     Date:  2020  Date of MD at [facility]   • LUMBAR / TRANSFORAMINAL EPIDURAL STEROID INJECTION Right 6/6/2018    Performed by Loura Mortimer, MD at [facility]   • LUMBAR FACET INJECTION OR MEDIAL BRANCH NERVE BLOCK Right 7/24/2018    Performed by Shilpa Khan Dander                  UNKNOWN    Comment:CAT  Ertapenem               RASH, ITCHING  [place]ass                   UNKNOWN  Statins                     Comment:Muscle cramping  [place], [place]        UNKNOWN  Polysporin [Bacitra*    RASH, ITCHING  SPIRIVA R items are noted in HPI. A comprehensive review of systems was negative. Physical Exam:   Vital Signs:  Blood pressure 122/55, pulse 85, temperature 98 °F (36.7 °C), temperature source Oral, resp.  rate (P) 16, height 5' 4\" (1.626 m), weight 211 lb 6.7

## 2020-04-30 NOTE — PROGRESS NOTES
[facility] Hospitalist Progress Note     Krystal Barrios Patient Status:  Inpatient    1955 MRN [id number]   [facility] 3SW-A Attending Tremaine Ibarra MD   [facility] Day # 3 PCP Pura Acosta MD     CC: follow up    SUBJECTIVE:  P • vancomycin HCl  125 mg Oral Daily   • levofloxacin  750 mg Oral Daily   • Amitriptyline HCl  10 mg Oral Nightly   • cetirizine  10 mg Oral Nightly   • Fluticasone Furoate-Vilanterol  1 puff Inhalation Daily   • folic acid  623 mcg Oral Daily   • gabape contraindication to [facility].     # Hypoxic resp failure 2/2 recent COVID-19 infection  - wean O2 as able - on RA today   - d/w pulm and ID about retesting to D/C isolation   - d/w Dr. Carmen Lobo of ID, plan repeat COVID PCR today, if neg, will d/w ID in AM about rep

## 2020-04-30 NOTE — PROGRESS NOTES
[facility]  Progress Note    Hans Mathis Patient Status:  Inpatient    1955 MRN [id number]   [facility] 3SW-A Attending Tayler Sun MD   [address] Day # 3 PCP Zenobia Gutierrez MD     Subjective:    Pt not seen (COVID Number [id number]     UNIT ABO A     UNIT RH POS     Product Status Debra Lawszi Date [id number]     Blood Type Barcode 6200          Imaging:          Medications reviewed.     • Hydrocortisone   Rectal BID   • vancomycin HCl  125 mg Oral D

## 2020-04-30 NOTE — OPERATIVE REPORT
EGD Operative Report    Cj Rojo Patient Status:  Inpatient    1955 MRN [id number]   Loca the procedure, findings, recommendations and follow up plans.   Kaci Almodovar  4/30/2020  11:27 AM

## 2020-04-30 NOTE — OPERATIVE REPORT
Colonoscopy Operative Report    Jameson Rojo Patient Status:  Inpatient    1955 MRN [id number]   [facility] 3SW-A Attending Marthenia Goodell, MD   [facility] Day #   3 PCP Demian Henning ileum. Recommendations:  EGD  Discharge: The patient was given an after visit summary detailing the procedure, findings, recommendations and follow up plans.      Kaci Almodovar  4/30/2020  10:48 AM

## 2020-04-30 NOTE — PLAN OF CARE
Patient denied pain/n/v. Drank most of golytely. Kept strict npo after midnight for procedure this am. Bms clear yellow, +flatus. Voiding clear yellow urine. VSS. On 1L O2. Safety maintained.      INR continues to be >2, notified MD. No new orders necessary

## 2020-04-30 NOTE — PLAN OF CARE
PROBLEM: GI bleed, UTI, +COVID (on 4/1/20)    ASSESSMENT: Pt is A&Ox4. VSS, afebrile. Maintaining O2 sat WNL on 2L; desaturated to 86% briefly on RA this morning. .  Repeat COVID PCR ordered by hospitalist to see if pt can come out of isolation (+COVID r hygiene and moisture  - Encourage food from home; allow for food preferences  - Enhance eating environment  Outcome: Progressing     Problem: HEMATOLOGIC - ADULT  Goal: Maintains hematologic stability  Description  INTERVENTIONS  - Assess for signs and sym

## 2020-05-01 PROCEDURE — 85018 HEMOGLOBIN: CPT | Performed by: INTERNAL MEDICINE

## 2020-05-01 PROCEDURE — 85610 PROTHROMBIN TIME: CPT | Performed by: INTERNAL MEDICINE

## 2020-05-01 PROCEDURE — C9057 INJ CETIRIZINE HYDROCHLORIDE: HCPCS | Performed by: INTERNAL MEDICINE

## 2020-05-01 PROCEDURE — 80048 BASIC METABOLIC PNL TOTAL CA: CPT | Performed by: INTERNAL MEDICINE

## 2020-05-01 PROCEDURE — 82962 GLUCOSE BLOOD TEST: CPT

## 2020-05-01 NOTE — PROGRESS NOTES
[facility] Dosing Service  Warfarin (Coumadin) Initial Dosing    Bianca Robles is a 59year old female for whom pharmacy has been consulted to dose warfarin (COUMADIN) for Treatment of venous thrombosis and hypercoagulable state by Dr. Gumaro De La Rosa.   Ba

## 2020-05-01 NOTE — PROGRESS NOTES
PROBLEM: UTI/DVT/RECTAL BLEED/COVID 19    ASSESSMENT: PT IS ALERT TIMES 4, ON RA-2L, SR ON TELE, VOIDS, NO C/O PAIN, TOLERATING DIET    ACTION: MEDICATIONS GIVEN AS ORDERED, CONTINUE TO MONITOR ON TELE AND , WEAN O2 AS TOLERATED, COUMADIN GIVEN TONIGHT

## 2020-05-01 NOTE — PROGRESS NOTES
201 [facility]  95/65/0302  [id number]   Provider:    Shaun Hurley MD         Subjective:  [facility] yesterday with hgb 7.1 to 6.9. Now 8.8 s/p 1 u prbcs. Covid +-patient not seen.  Spoke to patient over phone.      Allergies:    Da PRN **OR** dextrose 50 % injection 50 mL, 50 mL, Intravenous, Q15 Min PRN **OR** glucose (DEX4) oral liquid 30 g, 30 g, Oral, Q15 Min PRN **OR** Glucose-Vitamin C (DEX-4) chewable tab 8 tablet, 8 tablet, Oral, Q15 Min PRN  •  Albuterol Sulfate  (90 MD at [address] [facility]   • LUMBAR FACET INJECTION OR MEDIAL BRANCH NERVE BLOCK Right 7/24/2018    Performed by Madelin Lino MD at [address] Right 7/9/2018    Performed Ht 5' 4\" (1.626 m)   Wt 211 lb 6.7 oz (95.9 kg)   LMP  (LMP Unknown)   SpO2 98%   BMI 36.29 kg/m²  Body mass index is 36.29 kg/m². Not performed. Spoke to patient over phone to reduce PPE usage.       LAB/IMAGING RESULTS:     Lab Results   Component Tracey

## 2020-05-01 NOTE — PROGRESS NOTES
[facility]  Progress Note    Krystal Duet Patient Status:  Inpatient    1955 MRN [id number]   [facility] 3SW-A Attending Tremaine Ibarra MD   [address] Day # 4 PCP Pura Acosta MD     Subjective:    Pt not seen (COVID Daily   • folic acid  574 mcg Oral Daily   • gabapentin  600 mg Oral QAM   • gabapentin  900 mg Oral Nightly   • Montelukast Sodium  10 mg Oral Nightly   • PARoxetine HCl  15 mg Oral QAM   • Umeclidinium Bromide  1 puff Inhalation Daily   • Oxybutynin Chlo

## 2020-05-01 NOTE — PLAN OF CARE
Pt AOx4. VSS on RA,. Tele, NSR. Scds on. Lovenox. Coumadin. E.p. Voids. Denies pain. Up self. Loose stool. Po abx. Saline locked. Carb controlled diet, QID accu checks. Pt updated on poc, wctm.          Problem: Diabetes/Glucose Control  Goal: Glucose maint Therapy support as indicated  - Manage/alleviate anxiety  - Monitor for signs/symptoms of CO2 retention  Outcome: Progressing     Problem: GASTROINTESTINAL - ADULT  Goal: Minimal or absence of nausea and vomiting  Description  INTERVENTIONS:  - Maintain ad injury  Description  (Example usage: patient with low platelets)  INTERVENTIONS:  - Avoid intramuscular injections, enemas and rectal medication administration  - Ensure safe mobilization of patient  - Hold pressure on venipuncture sites to achieve adequat

## 2020-05-01 NOTE — PROGRESS NOTES
Pharmacy Dosing Service: Warfarin (Coumadin)    Pili Juarez is an 59year old female who was discharged with Xarelto on 4/18 for DVT.  In follow up with hematology, she was positive for Lupus Anticoagulant and transitioned to coumadin (started on 4/

## 2020-05-01 NOTE — PROGRESS NOTES
[facility] Hospitalist Progress Note     Rachele Nicole Patient Status:  Inpatient    1955 MRN [id number]   [facility] 3SW-A Attending Lawrence Mckeon MD   [facility] Day # 4 PCP Gaston Benitez MD     CC: follow up    SUBJECTIVE:  P Scheduled Medication:  • Warfarin Sodium  3 mg Oral Once at night   • Hydrocortisone   Rectal BID   • enoxaparin  1 mg/kg Subcutaneous 2 times per day   • vancomycin HCl  125 mg Oral Daily   • levofloxacin  750 mg Oral Daily   • Amitriptyline HCl  10 mg DVT  # Lupus anticoagulant  - hold AC d/t above  - heme consulted, apprec recs  - warfarin restarted with bridging lovenox until INR >2 (d/w Dr. Chemo Villalobos)     # Hypoxic resp failure 2/2 recent COVID-19 infection  - wean O2 as able - on RA today   - repeat CO

## 2020-05-02 PROCEDURE — 82962 GLUCOSE BLOOD TEST: CPT

## 2020-05-02 PROCEDURE — 85610 PROTHROMBIN TIME: CPT | Performed by: INTERNAL MEDICINE

## 2020-05-02 PROCEDURE — C9057 INJ CETIRIZINE HYDROCHLORIDE: HCPCS | Performed by: INTERNAL MEDICINE

## 2020-05-02 PROCEDURE — 85018 HEMOGLOBIN: CPT | Performed by: INTERNAL MEDICINE

## 2020-05-02 RX ORDER — PREDNISONE 20 MG/1
20 TABLET ORAL
Status: DISCONTINUED | OUTPATIENT
Start: 2020-05-02 | End: 2020-05-04

## 2020-05-02 RX ORDER — WARFARIN SODIUM 5 MG/1
5 TABLET ORAL
Status: COMPLETED | OUTPATIENT
Start: 2020-05-02 | End: 2020-05-02

## 2020-05-02 RX ORDER — TRAMADOL HYDROCHLORIDE 50 MG/1
TABLET ORAL EVERY 6 HOURS PRN
Status: DISCONTINUED | OUTPATIENT
Start: 2020-05-02 | End: 2020-05-04 | Stop reason: SDUPTHER

## 2020-05-02 NOTE — PROGRESS NOTES
[facility] Hospitalist Progress Note     Rigobertoadelita Cortez Patient Status:  Inpatient    1955 MRN [id number]   [facility] 3SW-A Attending Juve Madsen MD   [facility] Day # 5 PCP Oliva Kitchen MD     CC: follow up    SUBJECTIVE:  N Medication:  • Warfarin Sodium  5 mg Oral Once at night   • predniSONE  20 mg Oral Daily with breakfast   • Hydrocortisone   Rectal BID   • enoxaparin  1 mg/kg Subcutaneous 2 times per day   • vancomycin HCl  125 mg Oral Daily   • levofloxacin  750 mg Oral    # LLE DVT  # Lupus anticoagulant  - heme consulted, apprec recs  - warfarin restarted with bridging lovenox until INR >2     # Hypoxic resp failure 2/2 recent COVID-19 infection  - wean O2 as able - on RA today   - repeat COVID PCR 4/30 positive  - wi

## 2020-05-02 NOTE — PROGRESS NOTES
PATIENT A&OX4.  DENIES DIZZINESS/LIGHTHEADEDNES/HEADACHE. SATURATING IN THE LOW TO MID 90'S ON ROOM AIR AT REST. SHE IS HOLDING THE NASAL CANNULA ON HER LAP BUT HOPES THAT SHE WONT NEED IT TONIGHT.   SHE IS TRYING TO GO HOME WITHOUT OXYGEN THIS TIME

## 2020-05-02 NOTE — PROGRESS NOTES
[address] [facility] Dosing Service  Warfarin (Coumadin) Subsequent Dosing    Bianca Robles is a 59year old female for whom pharmacy is dosing warfarin (Coumadin).  Goal INR is 2-3    Recent Labs   Lab 04/28/20  0602 04/29/20  0805 04/30/20  0543 05/01/20  0

## 2020-05-02 NOTE — PROGRESS NOTES
[facility]  Progress Note    Josh Vázquez Patient Status:  Inpatient    1955 MRN [id number]   [facility] 3SW-A Attending Emili Guzman MD   [address] [address] [facility] [address] Day # 5 PCP Nela Lawton MD     Subjective:    Pt not seen due to Nightly   • PARoxetine HCl  15 mg Oral QAM   • Umeclidinium Bromide  1 puff Inhalation Daily   • Oxybutynin Chloride  5 mg Oral BID   • Albuterol Sulfate HFA  1 puff Inhalation TID         ASSESSMENT AND PLAN:     Ximena Butcher is a 59year old femal

## 2020-05-02 NOTE — PLAN OF CARE
Problem: Diabetes/Glucose Control  Goal: Glucose maintained within prescribed range  Description  INTERVENTIONS:  - Monitor Blood Glucose as ordered  - Assess for signs and symptoms of hyperglycemia and hypoglycemia  - Administer ordered medications to m Progressing     Problem: GASTROINTESTINAL - ADULT  Goal: Minimal or absence of nausea and vomiting  Description  INTERVENTIONS:  - Maintain adequate hydration with IV or PO as ordered and tolerated  - Nasogastric tube to low intermittent suction as ordered injections, enemas and rectal medication administration  - Ensure safe mobilization of patient  - Hold pressure on venipuncture sites to achieve adequate hemostasis  - Assess for signs and symptoms of internal bleeding  - Monitor lab trends  - Patient is t

## 2020-05-02 NOTE — PROGRESS NOTES
SP02 % ON ROOM AIR AT REST 94%  SP02 % AMBULATION ON ROOM AIR 98%  SPO2% AMBULATION ON 02 N/A ON  N/A LITERS PER MINUTE

## 2020-05-03 PROCEDURE — 85018 HEMOGLOBIN: CPT | Performed by: INTERNAL MEDICINE

## 2020-05-03 PROCEDURE — 85610 PROTHROMBIN TIME: CPT | Performed by: INTERNAL MEDICINE

## 2020-05-03 PROCEDURE — C9057 INJ CETIRIZINE HYDROCHLORIDE: HCPCS | Performed by: INTERNAL MEDICINE

## 2020-05-03 PROCEDURE — 82962 GLUCOSE BLOOD TEST: CPT

## 2020-05-03 RX ORDER — WARFARIN SODIUM 5 MG/1
5 TABLET ORAL
Status: COMPLETED | OUTPATIENT
Start: 2020-05-03 | End: 2020-05-03

## 2020-05-03 NOTE — PROGRESS NOTES
[address] [facility] Dosing Service  Warfarin (Coumadin) Subsequent Dosing    Rishi Gutierrez is a 59year old female for whom pharmacy is dosing warfarin (Coumadin).  Goal INR is 2-3    Recent Labs   Lab 04/29/20  0805 04/30/20  0543 05/01/20  0750 05/02/20  0

## 2020-05-03 NOTE — PLAN OF CARE
Problem: Diabetes/Glucose Control  Goal: Glucose maintained within prescribed range  Description  INTERVENTIONS:  - Monitor Blood Glucose as ordered  - Assess for signs and symptoms of hyperglycemia and hypoglycemia  - Administer ordered medications to m Therapy support as indicated  - Manage/alleviate anxiety  - Monitor for signs/symptoms of CO2 retention  Outcome: Progressing     Problem: GASTROINTESTINAL - ADULT  Goal: Minimal or absence of nausea and vomiting  Description  INTERVENTIONS:  - Maintain ad injury  Description  (Example usage: patient with low platelets)  INTERVENTIONS:  - Avoid intramuscular injections, enemas and rectal medication administration  - Ensure safe mobilization of patient  - Hold pressure on venipuncture sites to achieve adequat

## 2020-05-03 NOTE — PROGRESS NOTES
[facility] Hospitalist Progress Note     Josh Vázquez Patient Status:  Inpatient    1955 MRN [id number]   [facility] 3SW-A Attending Emili Guzman MD   [address] [facility] Day # 6 PCP Nela Lawton MD     CC: follow up    SUBJECTIVE:  N Scheduled Medication:  • predniSONE  20 mg Oral Daily with breakfast   • Hydrocortisone   Rectal BID   • enoxaparin  1 mg/kg Subcutaneous 2 times per day   • vancomycin HCl  125 mg Oral Daily   • levofloxacin  750 mg Oral Daily   • Amitriptyline HCl  10 PE - findings c/w recent COVID-19  - EKG w/o ischemic change  - repeat COVID PCR from 4/30 positive   - cont to wean O2 as able     # LLE DVT  # Lupus anticoagulant  - heme consulted, apprec recs  - warfarin restarted with bridging lovenox until INR >2

## 2020-05-03 NOTE — PROGRESS NOTES
PATIENT A&OX4. \"THE DOCTOR TOLD ME I MAY STILL NEED OXYGEN AT NIGHT WHEN I GO HOME, SO I'LL JUST WEAR IT TONIGHT\". HER MAIN CONCERN IS THE PAIN TO HER RIGHT GREAT TOE.  SHE WANTS TO CONTROL THE PAIN. WANTS HER TRAMADOL GIVEN TO HER EVERY 6 HOURS - \

## 2020-05-04 VITALS
DIASTOLIC BLOOD PRESSURE: 61 MMHG | HEART RATE: 72 BPM | TEMPERATURE: 98 F | SYSTOLIC BLOOD PRESSURE: 124 MMHG | HEIGHT: 64 IN | OXYGEN SATURATION: 96 % | RESPIRATION RATE: 22 BRPM | WEIGHT: 211.44 LBS | BODY MASS INDEX: 36.1 KG/M2

## 2020-05-04 PROCEDURE — 85018 HEMOGLOBIN: CPT | Performed by: INTERNAL MEDICINE

## 2020-05-04 PROCEDURE — 82962 GLUCOSE BLOOD TEST: CPT

## 2020-05-04 PROCEDURE — 85610 PROTHROMBIN TIME: CPT | Performed by: INTERNAL MEDICINE

## 2020-05-04 PROCEDURE — 85049 AUTOMATED PLATELET COUNT: CPT | Performed by: INTERNAL MEDICINE

## 2020-05-04 RX ORDER — TRAMADOL HYDROCHLORIDE 50 MG/1
100 TABLET ORAL EVERY 6 HOURS PRN
Status: DISCONTINUED | OUTPATIENT
Start: 2020-05-04 | End: 2020-05-04

## 2020-05-04 RX ORDER — WARFARIN SODIUM 2.5 MG/1
2.5 TABLET ORAL NIGHTLY
Qty: 60 TABLET | Refills: 0 | Status: SHIPPED | OUTPATIENT
Start: 2020-05-04 | End: 2020-10-27

## 2020-05-04 RX ORDER — TRAMADOL HYDROCHLORIDE 50 MG/1
50 TABLET ORAL EVERY 6 HOURS PRN
Qty: 20 TABLET | Refills: 0 | Status: SHIPPED | OUTPATIENT
Start: 2020-05-04 | End: 2020-06-22

## 2020-05-04 RX ORDER — PREDNISONE 20 MG/1
20 TABLET ORAL
Qty: 5 TABLET | Refills: 0 | Status: SHIPPED | OUTPATIENT
Start: 2020-05-05 | End: 2020-06-19

## 2020-05-04 RX ORDER — LEVOFLOXACIN 750 MG/1
750 TABLET ORAL DAILY
Qty: 1 TABLET | Refills: 0 | Status: SHIPPED | OUTPATIENT
Start: 2020-05-04 | End: 2020-05-05

## 2020-05-04 RX ORDER — TRAMADOL HYDROCHLORIDE 50 MG/1
50 TABLET ORAL EVERY 6 HOURS PRN
Status: DISCONTINUED | OUTPATIENT
Start: 2020-05-04 | End: 2020-05-04

## 2020-05-04 RX ORDER — HYDROCORTISONE 25 MG/G
1 CREAM TOPICAL 2 TIMES DAILY
Qty: 28 G | Refills: 2 | Status: SHIPPED | OUTPATIENT
Start: 2020-05-04 | End: 2020-06-03

## 2020-05-04 RX ORDER — VANCOMYCIN HYDROCHLORIDE 125 MG/1
125 CAPSULE ORAL DAILY
Qty: 1 CAPSULE | Refills: 0 | Status: SHIPPED | OUTPATIENT
Start: 2020-05-05 | End: 2020-06-19

## 2020-05-04 NOTE — PROGRESS NOTES
NURSING DISCHARGE NOTE    Discharged Home via Wheelchair. Accompanied by Support staff  Belongings Taken by patient/family. Patient discharged home via wheelchair by transport.  waiting with car in [place].  Patient and this RN reviewed d

## 2020-05-04 NOTE — PROGRESS NOTES
The event times are as reflected due to recovering of the Covid positive patient by GI RN and an add on EGD procedure after colonoscopy.

## 2020-05-04 NOTE — PLAN OF CARE
Problem: Diabetes/Glucose Control  Goal: Glucose maintained within prescribed range  Description  INTERVENTIONS:  - Monitor Blood Glucose as ordered  - Assess for signs and symptoms of hyperglycemia and hypoglycemia  - Administer ordered medications to m suctioning and perform as needed  - Assess and instruct to report SOB or any respiratory difficulty  - Respiratory Therapy support as indicated  - Manage/alleviate anxiety  - Monitor for signs/symptoms of CO2 retention  Outcome: Adequate for Discharge medications as ordered and appropriate  - Administer supportive blood products/factors as ordered and appropriate  Outcome: Adequate for Discharge  Goal: Free from bleeding injury  Description  (Example usage: patient with low platelets)  INTERVENTIONS:  -

## 2020-05-15 NOTE — PROGRESS NOTES
This is a post dated post op note. Pt tolerated the IV sedation well and without problems. Vitals signs were stable throughout and the was awake and aolert at the end. Care was transferred back to her nurse after report.

## 2020-05-26 RX ORDER — WARFARIN SODIUM 2.5 MG/1
TABLET ORAL
Qty: 60 TABLET | Refills: 0 | OUTPATIENT
Start: 2020-05-26

## 2020-06-19 PROBLEM — D50.0 IRON DEFICIENCY ANEMIA DUE TO CHRONIC BLOOD LOSS: Status: ACTIVE | Noted: 2020-06-19

## 2020-06-30 PROBLEM — Z79.01 MONITORING FOR ANTICOAGULANT USE: Status: ACTIVE | Noted: 2020-06-30

## 2020-06-30 PROBLEM — Z51.81 MONITORING FOR ANTICOAGULANT USE: Status: ACTIVE | Noted: 2020-06-30

## 2020-06-30 PROBLEM — I82.402 DEEP VEIN THROMBOSIS (DVT) OF LEFT LOWER EXTREMITY, UNSPECIFIED CHRONICITY, UNSPECIFIED VEIN (HCC): Status: ACTIVE | Noted: 2020-06-30

## 2020-07-28 PROBLEM — S46.012A STRAIN OF LEFT ROTATOR CUFF CAPSULE, INITIAL ENCOUNTER: Status: ACTIVE | Noted: 2019-08-20

## 2020-08-21 PROBLEM — M75.122 COMPLETE TEAR OF LEFT ROTATOR CUFF, UNSPECIFIED WHETHER TRAUMATIC: Status: ACTIVE | Noted: 2020-08-21

## 2020-09-14 PROBLEM — M84.375A METATARSAL STRESS FRACTURE OF LEFT FOOT: Status: ACTIVE | Noted: 2020-09-14

## 2020-09-14 PROBLEM — M25.571 ACUTE RIGHT ANKLE PAIN: Status: ACTIVE | Noted: 2020-09-14

## 2020-09-30 PROBLEM — S86.911A STRAIN OF RIGHT KNEE, INITIAL ENCOUNTER: Status: ACTIVE | Noted: 2020-09-30

## 2020-10-27 PROBLEM — I82.402 DEEP VEIN THROMBOSIS (DVT) OF LEFT LOWER EXTREMITY, UNSPECIFIED CHRONICITY, UNSPECIFIED VEIN (HCC): Status: RESOLVED | Noted: 2020-06-30 | Resolved: 2020-10-27

## 2020-10-27 PROBLEM — Z51.81 MONITORING FOR ANTICOAGULANT USE: Status: RESOLVED | Noted: 2020-06-30 | Resolved: 2020-10-27

## 2020-10-27 PROBLEM — Z79.01 MONITORING FOR ANTICOAGULANT USE: Status: RESOLVED | Noted: 2020-06-30 | Resolved: 2020-10-27

## 2023-04-15 ENCOUNTER — APPOINTMENT (OUTPATIENT)
Dept: ULTRASOUND IMAGING | Facility: HOSPITAL | Age: 68
End: 2023-04-15
Attending: EMERGENCY MEDICINE
Payer: MEDICARE

## 2023-04-15 ENCOUNTER — HOSPITAL ENCOUNTER (EMERGENCY)
Facility: HOSPITAL | Age: 68
Discharge: HOME | End: 2023-04-15
Attending: EMERGENCY MEDICINE
Payer: MEDICARE

## 2023-04-15 VITALS
SYSTOLIC BLOOD PRESSURE: 137 MMHG | DIASTOLIC BLOOD PRESSURE: 69 MMHG | OXYGEN SATURATION: 94 % | RESPIRATION RATE: 20 BRPM | HEART RATE: 84 BPM | TEMPERATURE: 98 F

## 2023-04-15 DIAGNOSIS — M79.89 LEG SWELLING: Primary | ICD-10-CM

## 2023-04-15 PROCEDURE — 99284 EMERGENCY DEPT VISIT MOD MDM: CPT

## 2023-04-15 PROCEDURE — 93971 EXTREMITY STUDY: CPT | Performed by: EMERGENCY MEDICINE

## 2023-04-15 NOTE — ED INITIAL ASSESSMENT (HPI)
Patient to the ER c/o possible DVT. Patient is having increased pain and swelling to left leg. Patient has hx of DVT, last one May 2020.  No n/v/d no fever

## 2024-06-30 ENCOUNTER — APPOINTMENT (OUTPATIENT)
Dept: GENERAL RADIOLOGY | Facility: HOSPITAL | Age: 69
End: 2024-06-30
Payer: MEDICARE

## 2024-06-30 ENCOUNTER — HOSPITAL ENCOUNTER (EMERGENCY)
Facility: HOSPITAL | Age: 69
Discharge: HOME | End: 2024-06-30
Attending: EMERGENCY MEDICINE
Payer: MEDICARE

## 2024-06-30 VITALS
HEIGHT: 64 IN | OXYGEN SATURATION: 93 % | BODY MASS INDEX: 39.27 KG/M2 | SYSTOLIC BLOOD PRESSURE: 158 MMHG | HEART RATE: 77 BPM | TEMPERATURE: 97 F | WEIGHT: 230 LBS | RESPIRATION RATE: 13 BRPM | DIASTOLIC BLOOD PRESSURE: 88 MMHG

## 2024-06-30 DIAGNOSIS — D64.9 CHRONIC ANEMIA: ICD-10-CM

## 2024-06-30 DIAGNOSIS — S93.401A SPRAIN OF RIGHT ANKLE, UNSPECIFIED LIGAMENT, INITIAL ENCOUNTER: Primary | ICD-10-CM

## 2024-06-30 DIAGNOSIS — S60.222A CONTUSION OF LEFT HAND, INITIAL ENCOUNTER: ICD-10-CM

## 2024-06-30 LAB
ALBUMIN SERPL-MCNC: 3.8 G/DL (ref 3.4–5)
ALBUMIN/GLOB SERPL: 1 {RATIO} (ref 1–2)
ALP LIVER SERPL-CCNC: 120 U/L
ALT SERPL-CCNC: 26 U/L
ANION GAP SERPL CALC-SCNC: 8 MMOL/L (ref 0–18)
AST SERPL-CCNC: 44 U/L (ref 15–37)
BASOPHILS # BLD AUTO: 0.05 X10(3) UL (ref 0–0.2)
BASOPHILS NFR BLD AUTO: 0.5 %
BILIRUB SERPL-MCNC: 0.8 MG/DL (ref 0.1–2)
BUN BLD-MCNC: 11 MG/DL (ref 9–23)
CALCIUM BLD-MCNC: 9.2 MG/DL (ref 8.5–10.1)
CHLORIDE SERPL-SCNC: 97 MMOL/L (ref 98–112)
CO2 SERPL-SCNC: 31 MMOL/L (ref 21–32)
CREAT BLD-MCNC: 1.1 MG/DL
EGFRCR SERPLBLD CKD-EPI 2021: 55 ML/MIN/1.73M2 (ref 60–?)
EOSINOPHIL # BLD AUTO: 0.41 X10(3) UL (ref 0–0.7)
EOSINOPHIL NFR BLD AUTO: 3.8 %
ERYTHROCYTE [DISTWIDTH] IN BLOOD BY AUTOMATED COUNT: 17.6 %
FLUAV + FLUBV RNA SPEC NAA+PROBE: NEGATIVE
FLUAV + FLUBV RNA SPEC NAA+PROBE: NEGATIVE
GLOBULIN PLAS-MCNC: 3.9 G/DL (ref 2.8–4.4)
GLUCOSE BLD-MCNC: 124 MG/DL (ref 70–99)
GLUCOSE BLD-MCNC: 154 MG/DL (ref 70–99)
HCT VFR BLD AUTO: 29.7 %
HGB BLD-MCNC: 9 G/DL
IMM GRANULOCYTES # BLD AUTO: 0.04 X10(3) UL (ref 0–1)
IMM GRANULOCYTES NFR BLD: 0.4 %
LYMPHOCYTES # BLD AUTO: 1.68 X10(3) UL (ref 1–4)
LYMPHOCYTES NFR BLD AUTO: 15.7 %
MCH RBC QN AUTO: 25.3 PG (ref 26–34)
MCHC RBC AUTO-ENTMCNC: 30.3 G/DL (ref 31–37)
MCV RBC AUTO: 83.4 FL
MONOCYTES # BLD AUTO: 1.09 X10(3) UL (ref 0.1–1)
MONOCYTES NFR BLD AUTO: 10.2 %
NEUTROPHILS # BLD AUTO: 7.44 X10 (3) UL (ref 1.5–7.7)
NEUTROPHILS # BLD AUTO: 7.44 X10(3) UL (ref 1.5–7.7)
NEUTROPHILS NFR BLD AUTO: 69.4 %
OSMOLALITY SERPL CALC.SUM OF ELEC: 283 MOSM/KG (ref 275–295)
PLATELET # BLD AUTO: 363 10(3)UL (ref 150–450)
POTASSIUM SERPL-SCNC: 3.7 MMOL/L (ref 3.5–5.1)
PROT SERPL-MCNC: 7.7 G/DL (ref 6.4–8.2)
RBC # BLD AUTO: 3.56 X10(6)UL
RSV RNA SPEC NAA+PROBE: NEGATIVE
SARS-COV-2 RNA RESP QL NAA+PROBE: NOT DETECTED
SODIUM SERPL-SCNC: 136 MMOL/L (ref 136–145)
TROPONIN I SERPL HS-MCNC: 7 NG/L
WBC # BLD AUTO: 10.7 X10(3) UL (ref 4–11)

## 2024-06-30 PROCEDURE — 82962 GLUCOSE BLOOD TEST: CPT

## 2024-06-30 PROCEDURE — 93010 ELECTROCARDIOGRAM REPORT: CPT

## 2024-06-30 PROCEDURE — 93005 ELECTROCARDIOGRAM TRACING: CPT

## 2024-06-30 PROCEDURE — 73130 X-RAY EXAM OF HAND: CPT

## 2024-06-30 PROCEDURE — 99285 EMERGENCY DEPT VISIT HI MDM: CPT

## 2024-06-30 PROCEDURE — 36415 COLL VENOUS BLD VENIPUNCTURE: CPT

## 2024-06-30 PROCEDURE — 80053 COMPREHEN METABOLIC PANEL: CPT | Performed by: EMERGENCY MEDICINE

## 2024-06-30 PROCEDURE — 84484 ASSAY OF TROPONIN QUANT: CPT | Performed by: EMERGENCY MEDICINE

## 2024-06-30 PROCEDURE — 73610 X-RAY EXAM OF ANKLE: CPT

## 2024-06-30 PROCEDURE — 0241U SARS-COV-2/FLU A AND B/RSV BY PCR (GENEXPERT): CPT | Performed by: EMERGENCY MEDICINE

## 2024-06-30 PROCEDURE — 85025 COMPLETE CBC W/AUTO DIFF WBC: CPT | Performed by: EMERGENCY MEDICINE

## 2024-06-30 NOTE — ED PROVIDER NOTES
Patient Seen in: [facility] Emergency Department      History     Chief Complaint   Patient presents with    Fall    Difficulty Breathing     Stated Complaint: dizziness, passed out and fell yesterday, left hand pain    Subjective:     HPI    68-year-old woman with COPD, diabetes (metoprolol), hypertension, and anxiety.  She said she was hospitalized for 3 weeks after she acquired COVID in 2020.  After that she had GI bleeding that she attributes to hemorrhoids but required a transfusion. The patient, experienced an episode of lightheadedness and subsequent fall yesterday afternoon. The lightheadedness occurred without any known triggers, such as dehydration or hypoglycemia. She was engaged in a physical activity, specifically reaching up to place potato chips on top of the refrigerator, when she felt lightheaded and fell.  As a result of the fall, she sustained injuries to her left hand and right ankle. She had an old ankle splint which she used for support after the fall. Upon examination, she reported pain across her right ankle, both when flexing her toes and at rest. In addition to the injuries from the fall, she also reported pain when breathing and coughing. However, it is unclear whether this is related to her fall or a separate issue such as Chronic Obstructive Pulmonary Disease (COPD).     Objective:   Past Medical History:    Allergic rhinitis    Anxiety state    Asthma ([facility])    Back problem    degenerative disk disease, herniated disk     Blood transfusion reaction    COPD (chronic obstructive pulmonary disease) ([facility])    no O2 at this time    Deep vein thrombosis ([facility])    Right leg - treated and resolved    Depression    Diabetes ([facility])    Diabetes mellitus (HCC)    Essential hypertension    Gout    Left toe    High blood pressure    History of blood transfusion    Hyperlipidemia    IBS (irritable bowel syndrome)    Incontinence of urine    Kidney failure    with sepsis---Resolved    Lupus  anticoagulant syndrome (HCC)    RACHEL (obstructive sleep apnea)    AHI 32 RDI 50 REM AHI 61 Supine AHI 42 Non-supine AHI 26    Osteoarthrosis, unspecified whether generalized or localized, unspecified site    Peripheral vascular disease (HCC)    PONV (postoperative nausea and vomiting)    emesis     Pulmonary emphysema (HCC)              Past Surgical History:   Procedure Laterality Date    Adenoidectomy      Cataract      bilateral in january    Colonoscopy  = Normal per patient    Colonoscopy  10/30/18= Normal    Repeat     Colonoscopy N/A 2020    Procedure: COLONOSCOPY;  Surgeon: Jamil Ruiz MD;  Location:  ENDOSCOPY    Hysterectomy  1985    Knee replacement surgery Right     Knee scope,med/lat menisectomy Right 2015    Procedure: ARTHROSCOPY RIGHT KNEE W/ MEDIAL MENISCECTOMY;  Surgeon: Lombardi, John A, MD;  [facility]    Other surgical history      Sinus     Other surgical history  14    Transilluminated powered phlebectomy of the right thigh (proximal Trivex) of two clusters.    Other surgical history  01/10/2022    Cysto Dr. Zapien    Sinus surgery        Stab phlebectomy, varicose veins, 1 extremity; <20 incisions  201    Tonsillectomy  1963                Social History     Socioeconomic History    Marital status:    Tobacco Use    Smoking status: Former     Current packs/day: 0.00     Average packs/day: 1.5 packs/day for 34.7 years (52.0 ttl pk-yrs)     Types: Cigarettes     Start date: 1/3/1973     Quit date: 2007     Years since quittin.8    Smokeless tobacco: Never   Vaping Use    Vaping status: Never Used   Substance and Sexual Activity    Alcohol use: Yes     Alcohol/week: 5.8 standard drinks of alcohol     Types: 7 Glasses of wine per week     Comment: 1-2 drinks daily    Drug use: No              Review of Systems    Positive for stated complaint: dizziness, passed out and fell yesterday, left hand pain  Other systems are as  noted in HPI.  Constitutional and vital signs reviewed.      All other systems reviewed and negative except as noted above.    Physical Exam     ED Triage Vitals [06/30/24 1205]   /82   Pulse 81   Resp 18   Temp 97 °F (36.1 °C)   Temp src Temporal   SpO2 92 %   O2 Device None (Room air)       Current:/88   Pulse 76   Temp 97 °F (36.1 °C) (Temporal)   Resp 10   Ht 162.6 cm (5' 4\")   Wt 104.3 kg   LMP  (LMP Unknown)   SpO2 94%   BMI 39.48 kg/m²     General:  Vitals as listed.  No acute distress   HEENT: Sclerae anicteric.  Conjunctivae show no pallor.  Oropharynx clear, mucous membranes moist   Lungs: good air exchange and clear   Heart: regular rate rhythm and no murmur   Abdomen: BMI 39  Extremities: Left hand contusion.  Diffuse right ankle tenderness without swelling/ecchymosis.  No edema, normal peripheral pulses   Neuro: Alert oriented and nonfocal      ED Course     Labs Reviewed   COMP METABOLIC PANEL (14) - Abnormal; Notable for the following components:       Result Value    Glucose 124 (*)     Chloride 97 (*)     Creatinine 1.10 (*)     eGFR-Cr 55 (*)     AST 44 (*)     All other components within normal limits   POCT GLUCOSE - Abnormal; Notable for the following components:    POC Glucose 154 (*)     All other components within normal limits   CBC W/ DIFFERENTIAL - Abnormal; Notable for the following components:    RBC 3.56 (*)     HGB 9.0 (*)     HCT 29.7 (*)     MCH 25.3 (*)     MCHC 30.3 (*)     Monocyte Absolute 1.09 (*)     All other components within normal limits   SARS-COV-2/FLU A AND B/RSV BY PCR (GENEXPERT) - Normal    Narrative:     This test is intended for the qualitative detection and differentiation of SARS-CoV-2, influenza A, influenza B, and respiratory syncytial virus (RSV) viral RNA in nasopharyngeal or nares swabs from individuals suspected of respiratory viral infection consistent with COVID-19 by their healthcare provider. Signs and symptoms of respiratory viral  infection due to SARS-CoV-2, influenza, and RSV can be similar.    Test performed using the Xpert Xpress SARS-CoV-2/FLU/RSV (real time RT-PCR)  assay on the [employer] instrument, [place], [place] [address].   This test is being used under the Food and Drug Administration's Emergency Use Authorization.    The authorized Fact Sheet for Healthcare Providers for this assay is available upon request from the laboratory.   CBC WITH DIFFERENTIAL WITH PLATELET    Narrative:     The following orders were created for panel order CBC With Differential With Platelet.  Procedure                               Abnormality         Status                     ---------                               -----------         ------                     CBC W/ DIFFERENTIAL[[id number]]          Abnormal            Final result                 Please view results for these tests on the individual orders.   TROPONIN I HIGH SENSITIVITY   RAINBOW DRAW LAVENDER   RAINBOW DRAW LIGHT GREEN     XR HAND (MIN 3 VIEWS), LEFT (CPT=73130)    Result Date: 6/30/2024  CONCLUSION:  No acute osseous injuries.  Mild to moderate scattered osteoarthritic changes of the hand as described above.   LOCATION:  [facility]   Dictated by (CST): Madie Wade DO on 6/30/2024 at 12:56 PM     Finalized by (CST): Madie Wade DO on 6/30/2024 at 12:57 PM       XR ANKLE (MIN 3 VIEWS), RIGHT (CPT=73610)    Result Date: 6/30/2024  CONCLUSION:  1. No acute osseous injuries. 2. Mild to moderate tibiotalar joint effusion noted. 3. Moderate calcaneal enthesopathy.   LOCATION:  [facility]   Dictated by (CST): Madie Wade DO on 6/30/2024 at 12:54 PM     Finalized by (CST): Madie Wade DO on 6/30/2024 at 12:56 PM        I independently read the radiographs and no fractures noted on hand and ankle x-rays.  EKG    Rate, intervals and axes as noted on EKG Report.  Rate: 76  Rhythm: Sinus Rhythm  Reading: Nonspecific ST-T changes and no other acute ST-T changes         ED COURSE and MDM      Patient to wear her ankle splint for the next week or 2    I believe the patient's fall was mechanical by history and not syncopal.      I have discussed with the patient the results of testing, differential diagnosis, and treatment plan. They expressed clear understanding of these instructions and agrees to the plan provided.    Disposition and Plan     Clinical Impression:  1. Sprain of right ankle, unspecified ligament, initial encounter    2. Contusion of left hand, initial encounter         Disposition:  There is no disposition on file for this visit.  There is no disposition time on file for this visit.    Follow-up:  No follow-up provider specified.      Medications Prescribed:  Current Discharge Medication List         Previously Declined (within the last year)

## 2024-06-30 NOTE — ED INITIAL ASSESSMENT (HPI)
Yesterday pt was dizzy had a syncopal episode and fell c/o pain 5/10 in middle and ring finer on the left hand, R ankle pain. Pt took Naproxen and Tramadol prior to arrival. Pt also c/o difficulty breathing, coughing up thick phlegm and low O2 levels at night. Hx of COPD, Asthma.

## 2024-06-30 NOTE — DISCHARGE INSTRUCTIONS
Take 1000 mg acetaminophen (2 Tylenol tablets) and/or 600 mg ibuprofen (3 Advil tablets) every 6 hours as needed for pain    Continue to wear your short ankle splint.

## 2024-07-01 LAB
ATRIAL RATE: 76 BPM
P AXIS: 75 DEGREES
P-R INTERVAL: 88 MS
Q-T INTERVAL: 440 MS
QRS DURATION: 104 MS
QTC CALCULATION (BEZET): 495 MS
R AXIS: 18 DEGREES
T AXIS: 172 DEGREES
VENTRICULAR RATE: 76 BPM

## (undated) DEVICE — DRAPE,U/SHT,SPLIT,FILM,60X84,STERILE: Brand: MEDLINE

## (undated) DEVICE — 4.0MM ROUND FAST CUTTING BUR

## (undated) DEVICE — ENDOSCOPY PACK - LOWER: Brand: MEDLINE INDUSTRIES, INC.

## (undated) DEVICE — 3M™ IOBAN™ 2 ANTIMICROBIAL INCISE DRAPE 6650EZ: Brand: IOBAN™ 2

## (undated) DEVICE — 3M™ MICROFOAM™ TAPE 1528-4: Brand: 3M™ MICROFOAM™

## (undated) DEVICE — NON-ADHERENT STRIPS,OIL EMULSION: Brand: CURITY

## (undated) DEVICE — SUTURE MONOCRYL 4-0 PS-2

## (undated) DEVICE — STERILE POLYISOPRENE POWDER-FREE SURGICAL GLOVES: Brand: PROTEXIS

## (undated) DEVICE — SUTURE VICRYL 3-0 CT-2

## (undated) DEVICE — SOL  .9 1000ML BTL

## (undated) DEVICE — 3M™ STERI-STRIP™ REINFORCED ADHESIVE SKIN CLOSURES, R1547, 1/2 IN X 4 IN (12 MM X 100 MM), 6 STRIPS/ENVELOPE: Brand: 3M™ STERI-STRIP™

## (undated) DEVICE — #15 STERILE STAINLESS BLADE: Brand: STERILE STAINLESS BLADES

## (undated) DEVICE — GAMMEX® PI HYBRID SIZE 8.5, STERILE POWDER-FREE SURGICAL GLOVE, POLYISOPRENE AND NEOPRENE BLEND: Brand: GAMMEX

## (undated) DEVICE — ESSENTIAL SHOULDER SLING L

## (undated) DEVICE — ORTHO CDS-LF: Brand: MEDLINE INDUSTRIES, INC.

## (undated) DEVICE — SYRINGE 10ML LL TIP

## (undated) DEVICE — SPK10022 SCHLEIN POSITIONING KIT: Brand: SPK10022 SCHLEIN POSITIONING KIT

## (undated) DEVICE — VIOLET BRAIDED (POLYGLACTIN 910), SYNTHETIC ABSORBABLE SUTURE: Brand: COATED VICRYL

## (undated) DEVICE — Device: Brand: DEFENDO AIR/WATER/SUCTION AND BIOPSY VALVE

## (undated) DEVICE — 1200CC GUARDIAN II: Brand: GUARDIAN

## (undated) DEVICE — CONVERTORS STOCKINETTE: Brand: CONVERTORS

## (undated) DEVICE — ENDOSCOPY PACK UPPER: Brand: MEDLINE INDUSTRIES, INC.

## (undated) DEVICE — ADHESIVE MASTISOL 2/3CC VL

## (undated) DEVICE — FILTERLINE NASAL ADULT O2/CO2

## (undated) DEVICE — GAMMEX® NON-LATEX PI ORTHO SIZE 8, STERILE POLYISOPRENE POWDER-FREE SURGICAL GLOVE: Brand: GAMMEX

## (undated) DEVICE — 3M™ RED DOT™ MONITORING ELECTRODE WITH FOAM TAPE AND STICKY GEL, 50/BAG, 20/CASE, 72/PLT 2570: Brand: RED DOT™

## (undated) DEVICE — KENDALL SCD EXPRESS SLEEVES, KNEE LENGTH, MEDIUM: Brand: KENDALL SCD

## (undated) NOTE — LETTER
Vera Braxton [address] [address]  [place], [address] [facility]    Consent for Operation  Date: __________________                                Time: _______________    1.  I authorize the performance upon [place] the following operation:  Proce procedure has been videotaped, the surgeon will obtain the original videotape. The hospital will not be responsible for storage or maintenance of this tape.   7. For the purpose of advancing medical education, I consent to the admittance of observers to the STATEMENTS REQUIRING INSERTION OR COMPLETION WERE FILLED IN.     Signature of Patient:   ___________________________    When the patient is a minor or mentally incompetent to give consent:  Signature of person authorized to consent for patient: ____________ supplements, and pills I can buy without a prescription (including street drugs/illegal medications). Failure to inform my anesthesiologist about these medicines may increase my risk of anesthetic complications. iv.  If I am allergic to anything or have ha Anesthesiologist Signature     Date   Time  I have discussed the procedure and information above with the patient (or patient’s representative) and answered their questions. The patient or their representative has agreed to have anesthesia services.     ___

## (undated) NOTE — ED AVS SNAPSHOT
Sydnee Velazco   MRN: [id number]    Department:  [facility] Emergency Department   Date of Visit:  3/6/2020           Disclosure     Insurance plans vary and the physician(s) referred by the ER may not be covered by your plan.  Please contact tell this physician (or your personal doctor if your instructions are to return to your personal doctor) about any new or lasting problems. The primary care or specialist physician will see patients referred from the [facility] Emergency Department.  Walker Gil

## (undated) NOTE — LETTER
Vera Braxton [address] [address]  [place], [address] [facility]    Consent for Operation  Date: __________________                                Time: _______________    1.  I authorize the performance upon Krystal Barrios the following operation:  Proce procedure has been videotaped, the surgeon will obtain the original videotape. The hospital will not be responsible for storage or maintenance of this tape.   7. For the purpose of advancing medical education, I consent to the admittance of observers to the STATEMENTS REQUIRING INSERTION OR COMPLETION WERE FILLED IN.     Signature of Patient:   ___________________________    When the patient is a minor or mentally incompetent to give consent:  Signature of person authorized to consent for patient: ____________ supplements, and pills I can buy without a prescription (including street drugs/illegal medications). Failure to inform my anesthesiologist about these medicines may increase my risk of anesthetic complications. iv.  If I am allergic to anything or have ha Anesthesiologist Signature     Date   Time  I have discussed the procedure and information above with the patient (or patient’s representative) and answered their questions. The patient or their representative has agreed to have anesthesia services.     ___

## (undated) NOTE — LETTER
Vera Braxton [address] [address]  [place], [address] [facility]    Consent for Operation  Date: __________________                                Time: _______________    1.  I authorize the performance upon [place] the following operation:  Proce procedure has been videotaped, the surgeon will obtain the original videotape. The hospital will not be responsible for storage or maintenance of this tape.   7. For the purpose of advancing medical education, I consent to the admittance of observers to the STATEMENTS REQUIRING INSERTION OR COMPLETION WERE FILLED IN.     Signature of Patient:   ___________________________    When the patient is a minor or mentally incompetent to give consent:  Signature of person authorized to consent for patient: ____________ supplements, and pills I can buy without a prescription (including street drugs/illegal medications). Failure to inform my anesthesiologist about these medicines may increase my risk of anesthetic complications. iv.  If I am allergic to anything or have ha Anesthesiologist Signature     Date   Time  I have discussed the procedure and information above with the patient (or patient’s representative) and answered their questions. The patient or their representative has agreed to have anesthesia services.     ___

## (undated) NOTE — ED AVS SNAPSHOT
Roseann Merritt   MRN: [id number]    Department:  [facility] Emergency Department   Date of Visit:  3/14/2020           Disclosure     Insurance plans vary and the physician(s) referred by the ER may not be covered by your plan.  Please contact tell this physician (or your personal doctor if your instructions are to return to your personal doctor) about any new or lasting problems. The primary care or specialist physician will see patients referred from the [facility] Emergency Department.  Michelle Reeves